# Patient Record
Sex: MALE | Race: WHITE | NOT HISPANIC OR LATINO | Employment: UNEMPLOYED | URBAN - METROPOLITAN AREA
[De-identification: names, ages, dates, MRNs, and addresses within clinical notes are randomized per-mention and may not be internally consistent; named-entity substitution may affect disease eponyms.]

---

## 2019-06-20 ENCOUNTER — HOSPITAL ENCOUNTER (EMERGENCY)
Facility: HOSPITAL | Age: 24
Discharge: HOME | End: 2019-06-21
Attending: EMERGENCY MEDICINE | Admitting: EMERGENCY MEDICINE

## 2019-06-20 DIAGNOSIS — R25.2 MUSCLE CRAMPS: Primary | ICD-10-CM

## 2019-06-20 DIAGNOSIS — E86.0 DEHYDRATION: ICD-10-CM

## 2019-06-20 DIAGNOSIS — R20.2 PARESTHESIAS: ICD-10-CM

## 2019-06-20 LAB
ALBUMIN SERPL-MCNC: 5.5 G/DL (ref 3.5–5.2)
ALBUMIN/GLOB SERPL: 2.4 G/DL
ALP SERPL-CCNC: 81 U/L (ref 39–117)
ALT SERPL W P-5'-P-CCNC: 19 U/L (ref 1–41)
ANION GAP SERPL CALCULATED.3IONS-SCNC: 21 MMOL/L
AST SERPL-CCNC: 24 U/L (ref 1–40)
BASOPHILS # BLD AUTO: 0.08 10*3/MM3 (ref 0–0.2)
BASOPHILS NFR BLD AUTO: 0.5 % (ref 0–1.5)
BILIRUB SERPL-MCNC: 1.3 MG/DL (ref 0.2–1.2)
BILIRUB UR QL STRIP: NEGATIVE
BUN BLD-MCNC: 16 MG/DL (ref 6–20)
BUN/CREAT SERPL: 14.5 (ref 7–25)
CALCIUM SPEC-SCNC: 10.4 MG/DL (ref 8.6–10.5)
CHLORIDE SERPL-SCNC: 93 MMOL/L (ref 98–107)
CK SERPL-CCNC: 306 U/L (ref 20–200)
CLARITY UR: CLEAR
CO2 SERPL-SCNC: 20 MMOL/L (ref 22–29)
COLOR UR: YELLOW
CREAT BLD-MCNC: 1.1 MG/DL (ref 0.76–1.27)
DEPRECATED RDW RBC AUTO: 36.7 FL (ref 37–54)
EOSINOPHIL # BLD AUTO: 0.06 10*3/MM3 (ref 0–0.4)
EOSINOPHIL NFR BLD AUTO: 0.4 % (ref 0.3–6.2)
ERYTHROCYTE [DISTWIDTH] IN BLOOD BY AUTOMATED COUNT: 11.7 % (ref 12.3–15.4)
GFR SERPL CREATININE-BSD FRML MDRD: 82 ML/MIN/1.73
GLOBULIN UR ELPH-MCNC: 2.3 GM/DL
GLUCOSE BLD-MCNC: 78 MG/DL (ref 65–99)
GLUCOSE UR STRIP-MCNC: NEGATIVE MG/DL
HCT VFR BLD AUTO: 47.9 % (ref 37.5–51)
HGB BLD-MCNC: 16.9 G/DL (ref 13–17.7)
HGB UR QL STRIP.AUTO: NEGATIVE
HOLD SPECIMEN: NORMAL
HOLD SPECIMEN: NORMAL
IMM GRANULOCYTES # BLD AUTO: 0.04 10*3/MM3 (ref 0–0.05)
IMM GRANULOCYTES NFR BLD AUTO: 0.2 % (ref 0–0.5)
KETONES UR QL STRIP: NEGATIVE
LEUKOCYTE ESTERASE UR QL STRIP.AUTO: NEGATIVE
LYMPHOCYTES # BLD AUTO: 3.39 10*3/MM3 (ref 0.7–3.1)
LYMPHOCYTES NFR BLD AUTO: 20.4 % (ref 19.6–45.3)
MCH RBC QN AUTO: 30.3 PG (ref 26.6–33)
MCHC RBC AUTO-ENTMCNC: 35.3 G/DL (ref 31.5–35.7)
MCV RBC AUTO: 86 FL (ref 79–97)
MONOCYTES # BLD AUTO: 1.15 10*3/MM3 (ref 0.1–0.9)
MONOCYTES NFR BLD AUTO: 6.9 % (ref 5–12)
NEUTROPHILS # BLD AUTO: 11.92 10*3/MM3 (ref 1.7–7)
NEUTROPHILS NFR BLD AUTO: 71.6 % (ref 42.7–76)
NITRITE UR QL STRIP: NEGATIVE
NRBC BLD AUTO-RTO: 0 /100 WBC (ref 0–0.2)
PH UR STRIP.AUTO: 6 [PH] (ref 5–8)
PLATELET # BLD AUTO: 283 10*3/MM3 (ref 140–450)
PMV BLD AUTO: 10.8 FL (ref 6–12)
POTASSIUM BLD-SCNC: 3.5 MMOL/L (ref 3.5–5.2)
PROT SERPL-MCNC: 7.8 G/DL (ref 6–8.5)
PROT UR QL STRIP: ABNORMAL
RBC # BLD AUTO: 5.57 10*6/MM3 (ref 4.14–5.8)
SODIUM BLD-SCNC: 134 MMOL/L (ref 136–145)
SP GR UR STRIP: 1.01 (ref 1–1.03)
UROBILINOGEN UR QL STRIP: ABNORMAL
WBC NRBC COR # BLD: 16.64 10*3/MM3 (ref 3.4–10.8)
WHOLE BLOOD HOLD SPECIMEN: NORMAL
WHOLE BLOOD HOLD SPECIMEN: NORMAL

## 2019-06-20 PROCEDURE — 81003 URINALYSIS AUTO W/O SCOPE: CPT

## 2019-06-20 PROCEDURE — 85025 COMPLETE CBC W/AUTO DIFF WBC: CPT

## 2019-06-20 PROCEDURE — 99284 EMERGENCY DEPT VISIT MOD MDM: CPT

## 2019-06-20 PROCEDURE — 82550 ASSAY OF CK (CPK): CPT | Performed by: EMERGENCY MEDICINE

## 2019-06-20 PROCEDURE — 80053 COMPREHEN METABOLIC PANEL: CPT

## 2019-06-20 RX ORDER — SODIUM CHLORIDE 0.9 % (FLUSH) 0.9 %
10 SYRINGE (ML) INJECTION AS NEEDED
Status: DISCONTINUED | OUTPATIENT
Start: 2019-06-20 | End: 2019-06-21 | Stop reason: HOSPADM

## 2019-06-21 VITALS
DIASTOLIC BLOOD PRESSURE: 68 MMHG | BODY MASS INDEX: 19.6 KG/M2 | WEIGHT: 140 LBS | OXYGEN SATURATION: 98 % | SYSTOLIC BLOOD PRESSURE: 116 MMHG | HEART RATE: 54 BPM | TEMPERATURE: 98 F | RESPIRATION RATE: 18 BRPM | HEIGHT: 71 IN

## 2019-06-21 PROCEDURE — 96360 HYDRATION IV INFUSION INIT: CPT

## 2019-06-21 RX ORDER — CETIRIZINE HYDROCHLORIDE 10 MG/1
10 TABLET ORAL DAILY
COMMUNITY
End: 2020-06-26

## 2019-06-21 RX ADMIN — SODIUM CHLORIDE, POTASSIUM CHLORIDE, SODIUM LACTATE AND CALCIUM CHLORIDE 2000 ML: 600; 310; 30; 20 INJECTION, SOLUTION INTRAVENOUS at 00:05

## 2020-06-26 ENCOUNTER — HOSPITAL ENCOUNTER (OUTPATIENT)
Facility: HOSPITAL | Age: 25
Setting detail: OBSERVATION
Discharge: HOME | End: 2020-06-27
Attending: EMERGENCY MEDICINE | Admitting: INTERNAL MEDICINE

## 2020-06-26 DIAGNOSIS — N17.9 AKI (ACUTE KIDNEY INJURY) (HCC): ICD-10-CM

## 2020-06-26 DIAGNOSIS — M62.82 NON-TRAUMATIC RHABDOMYOLYSIS: Primary | ICD-10-CM

## 2020-06-26 PROBLEM — E86.0 DEHYDRATION: Status: ACTIVE | Noted: 2020-06-26

## 2020-06-26 PROBLEM — E87.6 HYPOKALEMIA: Status: ACTIVE | Noted: 2020-06-26

## 2020-06-26 PROBLEM — R74.8 ELEVATED LIVER ENZYMES: Status: ACTIVE | Noted: 2020-06-26

## 2020-06-26 PROBLEM — D72.829 LEUKOCYTOSIS: Status: ACTIVE | Noted: 2020-06-26

## 2020-06-26 PROBLEM — Z72.0 TOBACCO ABUSE: Status: ACTIVE | Noted: 2020-06-26

## 2020-06-26 LAB
ALBUMIN SERPL-MCNC: 5.4 G/DL (ref 3.5–5.2)
ALBUMIN/GLOB SERPL: 2.1 G/DL
ALP SERPL-CCNC: 58 U/L (ref 39–117)
ALT SERPL W P-5'-P-CCNC: 57 U/L (ref 1–41)
AMPHET+METHAMPHET UR QL: NEGATIVE
AMPHETAMINES UR QL: NEGATIVE
ANION GAP SERPL CALCULATED.3IONS-SCNC: 17 MMOL/L (ref 5–15)
AST SERPL-CCNC: 92 U/L (ref 1–40)
BACTERIA UR QL AUTO: ABNORMAL /HPF
BARBITURATES UR QL SCN: NEGATIVE
BASOPHILS # BLD AUTO: 0.05 10*3/MM3 (ref 0–0.2)
BASOPHILS NFR BLD AUTO: 0.3 % (ref 0–1.5)
BENZODIAZ UR QL SCN: NEGATIVE
BILIRUB SERPL-MCNC: 1.4 MG/DL (ref 0.2–1.2)
BILIRUB UR QL STRIP: NEGATIVE
BUN BLD-MCNC: 28 MG/DL (ref 6–20)
BUN/CREAT SERPL: 17.4 (ref 7–25)
BUPRENORPHINE SERPL-MCNC: NEGATIVE NG/ML
CALCIUM SPEC-SCNC: 9.9 MG/DL (ref 8.6–10.5)
CANNABINOIDS SERPL QL: POSITIVE
CHLORIDE SERPL-SCNC: 93 MMOL/L (ref 98–107)
CK SERPL-CCNC: 2599 U/L (ref 20–200)
CLARITY UR: CLEAR
CO2 SERPL-SCNC: 24 MMOL/L (ref 22–29)
COCAINE UR QL: NEGATIVE
COLOR UR: YELLOW
CREAT BLD-MCNC: 1.61 MG/DL (ref 0.76–1.27)
DEPRECATED RDW RBC AUTO: 38.2 FL (ref 37–54)
EOSINOPHIL # BLD AUTO: 0.01 10*3/MM3 (ref 0–0.4)
EOSINOPHIL NFR BLD AUTO: 0.1 % (ref 0.3–6.2)
ERYTHROCYTE [DISTWIDTH] IN BLOOD BY AUTOMATED COUNT: 11.9 % (ref 12.3–15.4)
GFR SERPL CREATININE-BSD FRML MDRD: 53 ML/MIN/1.73
GLOBULIN UR ELPH-MCNC: 2.6 GM/DL
GLUCOSE BLD-MCNC: 86 MG/DL (ref 65–99)
GLUCOSE UR STRIP-MCNC: NEGATIVE MG/DL
HCT VFR BLD AUTO: 47.4 % (ref 37.5–51)
HGB BLD-MCNC: 16.7 G/DL (ref 13–17.7)
HGB UR QL STRIP.AUTO: NEGATIVE
HYALINE CASTS UR QL AUTO: ABNORMAL /LPF
IMM GRANULOCYTES # BLD AUTO: 0.08 10*3/MM3 (ref 0–0.05)
IMM GRANULOCYTES NFR BLD AUTO: 0.5 % (ref 0–0.5)
KETONES UR QL STRIP: ABNORMAL
LEUKOCYTE ESTERASE UR QL STRIP.AUTO: NEGATIVE
LIPASE SERPL-CCNC: 35 U/L (ref 13–60)
LYMPHOCYTES # BLD AUTO: 1.62 10*3/MM3 (ref 0.7–3.1)
LYMPHOCYTES NFR BLD AUTO: 10 % (ref 19.6–45.3)
MAGNESIUM SERPL-MCNC: 2.2 MG/DL (ref 1.6–2.6)
MCH RBC QN AUTO: 31.1 PG (ref 26.6–33)
MCHC RBC AUTO-ENTMCNC: 35.2 G/DL (ref 31.5–35.7)
MCV RBC AUTO: 88.3 FL (ref 79–97)
METHADONE UR QL SCN: NEGATIVE
MONOCYTES # BLD AUTO: 1.24 10*3/MM3 (ref 0.1–0.9)
MONOCYTES NFR BLD AUTO: 7.6 % (ref 5–12)
NEUTROPHILS # BLD AUTO: 13.27 10*3/MM3 (ref 1.7–7)
NEUTROPHILS NFR BLD AUTO: 81.5 % (ref 42.7–76)
NITRITE UR QL STRIP: NEGATIVE
NRBC BLD AUTO-RTO: 0 /100 WBC (ref 0–0.2)
OPIATES UR QL: NEGATIVE
OXYCODONE UR QL SCN: NEGATIVE
PCP UR QL SCN: NEGATIVE
PH UR STRIP.AUTO: 6 [PH] (ref 5–8)
PLATELET # BLD AUTO: 281 10*3/MM3 (ref 140–450)
PMV BLD AUTO: 10.8 FL (ref 6–12)
POTASSIUM BLD-SCNC: 3.4 MMOL/L (ref 3.5–5.2)
PROPOXYPH UR QL: NEGATIVE
PROT SERPL-MCNC: 8 G/DL (ref 6–8.5)
PROT UR QL STRIP: ABNORMAL
RBC # BLD AUTO: 5.37 10*6/MM3 (ref 4.14–5.8)
RBC # UR: ABNORMAL /HPF
REF LAB TEST METHOD: ABNORMAL
SODIUM BLD-SCNC: 134 MMOL/L (ref 136–145)
SP GR UR STRIP: 1.02 (ref 1–1.03)
SQUAMOUS #/AREA URNS HPF: ABNORMAL /HPF
TRICYCLICS UR QL SCN: NEGATIVE
UROBILINOGEN UR QL STRIP: ABNORMAL
WBC NRBC COR # BLD: 16.27 10*3/MM3 (ref 3.4–10.8)
WBC UR QL AUTO: ABNORMAL /HPF

## 2020-06-26 PROCEDURE — 82550 ASSAY OF CK (CPK): CPT | Performed by: NURSE PRACTITIONER

## 2020-06-26 PROCEDURE — 83735 ASSAY OF MAGNESIUM: CPT | Performed by: NURSE PRACTITIONER

## 2020-06-26 PROCEDURE — G0378 HOSPITAL OBSERVATION PER HR: HCPCS

## 2020-06-26 PROCEDURE — 99284 EMERGENCY DEPT VISIT MOD MDM: CPT

## 2020-06-26 PROCEDURE — 85025 COMPLETE CBC W/AUTO DIFF WBC: CPT | Performed by: NURSE PRACTITIONER

## 2020-06-26 PROCEDURE — 80053 COMPREHEN METABOLIC PANEL: CPT | Performed by: NURSE PRACTITIONER

## 2020-06-26 PROCEDURE — 99219 PR INITIAL OBSERVATION CARE/DAY 50 MINUTES: CPT | Performed by: NURSE PRACTITIONER

## 2020-06-26 PROCEDURE — 80306 DRUG TEST PRSMV INSTRMNT: CPT | Performed by: NURSE PRACTITIONER

## 2020-06-26 PROCEDURE — 81001 URINALYSIS AUTO W/SCOPE: CPT | Performed by: NURSE PRACTITIONER

## 2020-06-26 PROCEDURE — 83690 ASSAY OF LIPASE: CPT | Performed by: NURSE PRACTITIONER

## 2020-06-26 RX ORDER — LEVOCETIRIZINE DIHYDROCHLORIDE 5 MG/1
5 TABLET, FILM COATED ORAL EVERY EVENING
COMMUNITY
End: 2022-03-07

## 2020-06-26 RX ORDER — SODIUM CHLORIDE 0.9 % (FLUSH) 0.9 %
10 SYRINGE (ML) INJECTION AS NEEDED
Status: DISCONTINUED | OUTPATIENT
Start: 2020-06-26 | End: 2020-06-27 | Stop reason: HOSPADM

## 2020-06-26 RX ADMIN — SODIUM CHLORIDE, POTASSIUM CHLORIDE, SODIUM LACTATE AND CALCIUM CHLORIDE 1000 ML: 600; 310; 30; 20 INJECTION, SOLUTION INTRAVENOUS at 17:16

## 2020-06-26 RX ADMIN — SODIUM CHLORIDE, POTASSIUM CHLORIDE, SODIUM LACTATE AND CALCIUM CHLORIDE 1000 ML: 600; 310; 30; 20 INJECTION, SOLUTION INTRAVENOUS at 16:01

## 2020-06-27 VITALS
HEIGHT: 71 IN | SYSTOLIC BLOOD PRESSURE: 110 MMHG | RESPIRATION RATE: 18 BRPM | DIASTOLIC BLOOD PRESSURE: 50 MMHG | BODY MASS INDEX: 20.58 KG/M2 | TEMPERATURE: 98.7 F | WEIGHT: 147 LBS | HEART RATE: 50 BPM | OXYGEN SATURATION: 97 %

## 2020-06-27 LAB
ALBUMIN SERPL-MCNC: 3.9 G/DL (ref 3.5–5.2)
ALBUMIN/GLOB SERPL: 2.3 G/DL
ALP SERPL-CCNC: 46 U/L (ref 39–117)
ALT SERPL W P-5'-P-CCNC: 41 U/L (ref 1–41)
ANION GAP SERPL CALCULATED.3IONS-SCNC: 8 MMOL/L (ref 5–15)
AST SERPL-CCNC: 51 U/L (ref 1–40)
BASOPHILS # BLD AUTO: 0.05 10*3/MM3 (ref 0–0.2)
BASOPHILS NFR BLD AUTO: 0.6 % (ref 0–1.5)
BILIRUB SERPL-MCNC: 0.7 MG/DL (ref 0.2–1.2)
BUN BLD-MCNC: 27 MG/DL (ref 6–20)
BUN/CREAT SERPL: 25.5 (ref 7–25)
CALCIUM SPEC-SCNC: 8.5 MG/DL (ref 8.6–10.5)
CHLORIDE SERPL-SCNC: 105 MMOL/L (ref 98–107)
CK SERPL-CCNC: 1269 U/L (ref 20–200)
CO2 SERPL-SCNC: 27 MMOL/L (ref 22–29)
CREAT BLD-MCNC: 1.06 MG/DL (ref 0.76–1.27)
DEPRECATED RDW RBC AUTO: 40.9 FL (ref 37–54)
EOSINOPHIL # BLD AUTO: 0.14 10*3/MM3 (ref 0–0.4)
EOSINOPHIL NFR BLD AUTO: 1.7 % (ref 0.3–6.2)
ERYTHROCYTE [DISTWIDTH] IN BLOOD BY AUTOMATED COUNT: 12.2 % (ref 12.3–15.4)
GFR SERPL CREATININE-BSD FRML MDRD: 85 ML/MIN/1.73
GLOBULIN UR ELPH-MCNC: 1.7 GM/DL
GLUCOSE BLD-MCNC: 100 MG/DL (ref 65–99)
HCT VFR BLD AUTO: 39.8 % (ref 37.5–51)
HGB BLD-MCNC: 13.6 G/DL (ref 13–17.7)
IMM GRANULOCYTES # BLD AUTO: 0.02 10*3/MM3 (ref 0–0.05)
IMM GRANULOCYTES NFR BLD AUTO: 0.2 % (ref 0–0.5)
LYMPHOCYTES # BLD AUTO: 3.27 10*3/MM3 (ref 0.7–3.1)
LYMPHOCYTES NFR BLD AUTO: 40.8 % (ref 19.6–45.3)
MCH RBC QN AUTO: 31.2 PG (ref 26.6–33)
MCHC RBC AUTO-ENTMCNC: 34.2 G/DL (ref 31.5–35.7)
MCV RBC AUTO: 91.3 FL (ref 79–97)
MONOCYTES # BLD AUTO: 0.9 10*3/MM3 (ref 0.1–0.9)
MONOCYTES NFR BLD AUTO: 11.2 % (ref 5–12)
NEUTROPHILS # BLD AUTO: 3.64 10*3/MM3 (ref 1.7–7)
NEUTROPHILS NFR BLD AUTO: 45.5 % (ref 42.7–76)
NRBC BLD AUTO-RTO: 0 /100 WBC (ref 0–0.2)
PLATELET # BLD AUTO: 220 10*3/MM3 (ref 140–450)
PMV BLD AUTO: 11.4 FL (ref 6–12)
POTASSIUM BLD-SCNC: 4.5 MMOL/L (ref 3.5–5.2)
PROT SERPL-MCNC: 5.6 G/DL (ref 6–8.5)
RBC # BLD AUTO: 4.36 10*6/MM3 (ref 4.14–5.8)
SODIUM BLD-SCNC: 140 MMOL/L (ref 136–145)
WBC NRBC COR # BLD: 8.02 10*3/MM3 (ref 3.4–10.8)

## 2020-06-27 PROCEDURE — G0378 HOSPITAL OBSERVATION PER HR: HCPCS

## 2020-06-27 PROCEDURE — 85025 COMPLETE CBC W/AUTO DIFF WBC: CPT | Performed by: NURSE PRACTITIONER

## 2020-06-27 PROCEDURE — 99217 PR OBSERVATION CARE DISCHARGE MANAGEMENT: CPT | Performed by: INTERNAL MEDICINE

## 2020-06-27 PROCEDURE — 80053 COMPREHEN METABOLIC PANEL: CPT | Performed by: NURSE PRACTITIONER

## 2020-06-27 PROCEDURE — 82550 ASSAY OF CK (CPK): CPT | Performed by: NURSE PRACTITIONER

## 2020-06-27 RX ORDER — SODIUM CHLORIDE 0.9 % (FLUSH) 0.9 %
10 SYRINGE (ML) INJECTION AS NEEDED
Status: DISCONTINUED | OUTPATIENT
Start: 2020-06-27 | End: 2020-06-27 | Stop reason: HOSPADM

## 2020-06-27 RX ORDER — POTASSIUM CHLORIDE 750 MG/1
20 CAPSULE, EXTENDED RELEASE ORAL ONCE
Status: COMPLETED | OUTPATIENT
Start: 2020-06-27 | End: 2020-06-27

## 2020-06-27 RX ORDER — SODIUM CHLORIDE 0.9 % (FLUSH) 0.9 %
10 SYRINGE (ML) INJECTION EVERY 12 HOURS SCHEDULED
Status: DISCONTINUED | OUTPATIENT
Start: 2020-06-27 | End: 2020-06-27 | Stop reason: HOSPADM

## 2020-06-27 RX ORDER — ACETAMINOPHEN 325 MG/1
650 TABLET ORAL EVERY 4 HOURS PRN
Status: DISCONTINUED | OUTPATIENT
Start: 2020-06-27 | End: 2020-06-27 | Stop reason: HOSPADM

## 2020-06-27 RX ORDER — SODIUM CHLORIDE, SODIUM LACTATE, POTASSIUM CHLORIDE, CALCIUM CHLORIDE 600; 310; 30; 20 MG/100ML; MG/100ML; MG/100ML; MG/100ML
200 INJECTION, SOLUTION INTRAVENOUS CONTINUOUS
Status: DISCONTINUED | OUTPATIENT
Start: 2020-06-27 | End: 2020-06-27 | Stop reason: HOSPADM

## 2020-06-27 RX ADMIN — POTASSIUM CHLORIDE 20 MEQ: 10 CAPSULE, COATED, EXTENDED RELEASE ORAL at 02:18

## 2020-06-27 RX ADMIN — SODIUM CHLORIDE, POTASSIUM CHLORIDE, SODIUM LACTATE AND CALCIUM CHLORIDE 200 ML/HR: 600; 310; 30; 20 INJECTION, SOLUTION INTRAVENOUS at 02:19

## 2022-03-07 ENCOUNTER — OFFICE VISIT (OUTPATIENT)
Dept: FAMILY MEDICINE CLINIC | Facility: CLINIC | Age: 27
End: 2022-03-07

## 2022-03-07 ENCOUNTER — PATIENT ROUNDING (BHMG ONLY) (OUTPATIENT)
Dept: FAMILY MEDICINE CLINIC | Facility: CLINIC | Age: 27
End: 2022-03-07

## 2022-03-07 ENCOUNTER — LAB (OUTPATIENT)
Dept: LAB | Facility: HOSPITAL | Age: 27
End: 2022-03-07

## 2022-03-07 VITALS
RESPIRATION RATE: 14 BRPM | WEIGHT: 146.4 LBS | OXYGEN SATURATION: 96 % | DIASTOLIC BLOOD PRESSURE: 82 MMHG | HEART RATE: 82 BPM | SYSTOLIC BLOOD PRESSURE: 130 MMHG | TEMPERATURE: 97.5 F | HEIGHT: 71 IN | BODY MASS INDEX: 20.5 KG/M2

## 2022-03-07 DIAGNOSIS — Z00.00 PREVENTATIVE HEALTH CARE: ICD-10-CM

## 2022-03-07 DIAGNOSIS — J32.9 SINUSITIS, UNSPECIFIED CHRONICITY, UNSPECIFIED LOCATION: ICD-10-CM

## 2022-03-07 DIAGNOSIS — J06.9 UPPER RESPIRATORY TRACT INFECTION, UNSPECIFIED TYPE: Primary | ICD-10-CM

## 2022-03-07 DIAGNOSIS — Z87.39 HISTORY OF RHABDOMYOLYSIS: ICD-10-CM

## 2022-03-07 LAB
25(OH)D3 SERPL-MCNC: 21.6 NG/ML
BACTERIA UR QL AUTO: ABNORMAL /HPF
BILIRUB UR QL STRIP: NEGATIVE
CLARITY UR: CLEAR
COLOR UR: ABNORMAL
DEPRECATED RDW RBC AUTO: 37.5 FL (ref 37–54)
ERYTHROCYTE [DISTWIDTH] IN BLOOD BY AUTOMATED COUNT: 11.8 % (ref 12.3–15.4)
ERYTHROCYTE [SEDIMENTATION RATE] IN BLOOD: 21 MM/HR (ref 0–15)
GLUCOSE UR STRIP-MCNC: NEGATIVE MG/DL
HCT VFR BLD AUTO: 50 % (ref 37.5–51)
HGB BLD-MCNC: 17.4 G/DL (ref 13–17.7)
HGB UR QL STRIP.AUTO: NEGATIVE
HYALINE CASTS UR QL AUTO: ABNORMAL /LPF
KETONES UR QL STRIP: NEGATIVE
LEUKOCYTE ESTERASE UR QL STRIP.AUTO: NEGATIVE
MCH RBC QN AUTO: 30.6 PG (ref 26.6–33)
MCHC RBC AUTO-ENTMCNC: 34.8 G/DL (ref 31.5–35.7)
MCV RBC AUTO: 88 FL (ref 79–97)
NITRITE UR QL STRIP: NEGATIVE
PH UR STRIP.AUTO: 6 [PH] (ref 5–8)
PLATELET # BLD AUTO: 191 10*3/MM3 (ref 140–450)
PMV BLD AUTO: 12 FL (ref 6–12)
PROT UR QL STRIP: ABNORMAL
RBC # BLD AUTO: 5.68 10*6/MM3 (ref 4.14–5.8)
RBC # UR STRIP: ABNORMAL /HPF
REF LAB TEST METHOD: ABNORMAL
SP GR UR STRIP: >=1.03 (ref 1–1.03)
SQUAMOUS #/AREA URNS HPF: ABNORMAL /HPF
UROBILINOGEN UR QL STRIP: ABNORMAL
WBC # UR STRIP: ABNORMAL /HPF
WBC NRBC COR # BLD: 4.83 10*3/MM3 (ref 3.4–10.8)

## 2022-03-07 PROCEDURE — 82550 ASSAY OF CK (CPK): CPT

## 2022-03-07 PROCEDURE — 81001 URINALYSIS AUTO W/SCOPE: CPT

## 2022-03-07 PROCEDURE — 36415 COLL VENOUS BLD VENIPUNCTURE: CPT

## 2022-03-07 PROCEDURE — 84443 ASSAY THYROID STIM HORMONE: CPT

## 2022-03-07 PROCEDURE — 82306 VITAMIN D 25 HYDROXY: CPT

## 2022-03-07 PROCEDURE — 85027 COMPLETE CBC AUTOMATED: CPT

## 2022-03-07 PROCEDURE — 99214 OFFICE O/P EST MOD 30 MIN: CPT | Performed by: PHYSICIAN ASSISTANT

## 2022-03-07 PROCEDURE — 86140 C-REACTIVE PROTEIN: CPT

## 2022-03-07 PROCEDURE — 80053 COMPREHEN METABOLIC PANEL: CPT

## 2022-03-07 PROCEDURE — 85652 RBC SED RATE AUTOMATED: CPT

## 2022-03-07 PROCEDURE — 86038 ANTINUCLEAR ANTIBODIES: CPT

## 2022-03-07 PROCEDURE — 80061 LIPID PANEL: CPT

## 2022-03-07 PROCEDURE — U0004 COV-19 TEST NON-CDC HGH THRU: HCPCS | Performed by: PHYSICIAN ASSISTANT

## 2022-03-07 NOTE — PROGRESS NOTES
Chief Complaint   Patient presents with   • Headache     X 3 days   • head congestion   • Nasal Congestion       HPI     Cj Lerma III is a pleasant 26 y.o. male with a PMH of seasonal allergies who presents for initial visit.   He c/o 5-day history of frontal head pressure and runny nose. Associated headaches, body aches, cough productive of green-yellow sputum. Denies fever, chills, sick exposures. Currently unemployed. He states he is in the process of moving. He works part-time for [employer]. He tends to get sinus infections every year. He has used sudafed and mucinex, ibuprofen for headache.     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 First episode occurred at age 20 during a soccer tournament, every muscle in his body was flexed for multiple hours. He had played 2-3 games. Since then this has recurred, requiring trips to the ER. Episodes can also can occur while driving: hands and lips go numb then muscles start tensing up. Now he knows to pull over and immediately lie down. In past referred to neurology at  but he was not seen. At one time it was recommended he see a specialist in [place] or [place] but he could not do this because of his insurance. Since then his insurance has changed.      Past Medical History:   Diagnosis Date   • Exercise-induced asthma    • Rhabdomyolysis        Past Surgical History:   Procedure Laterality Date   • KNEE ACL RECONSTRUCTION Left    • TONSILLECTOMY     • WISDOM TOOTH EXTRACTION         Family History   Problem Relation Age of Onset   • Thyroid disease Mother    • Hypertension Father    • Breast cancer Paternal Aunt    • Lung cancer Maternal Grandfather    • Lung cancer Paternal Grandfather    • Hypertension Paternal Grandfather        Social History     Socioeconomic History   • Marital status: Single   Tobacco Use   • Smoking status: Former Smoker     Types: Cigarettes, Electronic Cigarette   •  Smokeless tobacco: Never Used   • Tobacco comment: socially   Vaping Use   • Vaping Use: Every day   • Substances: Nicotine, THC, CBD, Flavoring   • Devices: Disposable   Substance and Sexual Activity   • Alcohol use: Yes     Comment: socially   • Drug use: Not Currently     Types: Marijuana     Comment: occasionally   • Sexual activity: Defer       No Known Allergies    ROS    Review of Systems   Constitutional: Negative for chills and fever.   HENT: Positive for congestion, rhinorrhea and sinus pressure.    Respiratory: Positive for cough. Negative for shortness of breath and wheezing.    Musculoskeletal: Positive for myalgias.   Neurological: Positive for headache.       Vitals:    03/07/22 1449   BP: 130/82   Pulse: 82   Resp: 14   Temp: 97.5 °F (36.4 °C)   SpO2: 96%     Body mass index is 20.42 kg/m².      Current Outpatient Medications:   •  albuterol (PROVENTIL HFA;VENTOLIN HFA) 108 (90 BASE) MCG/ACT inhaler, Inhale 2 puffs every 4 (four) hours as needed for wheezing., Disp: , Rfl:   •  Cetirizine HCl (ZYRTEC ALLERGY PO), Take  by mouth., Disp: , Rfl:     PE    Physical Exam  Vitals reviewed.   Constitutional:       General: He is not in acute distress.     Appearance: He is well-developed.   HENT:      Head: Normocephalic.      Right Ear: Tympanic membrane and ear canal normal. Tympanic membrane is not erythematous.      Left Ear: Tympanic membrane and ear canal normal. Tympanic membrane is not erythematous.      Nose:      Right Sinus: No maxillary sinus tenderness or frontal sinus tenderness.      Left Sinus: No maxillary sinus tenderness or frontal sinus tenderness.      Mouth/Throat:      Mouth: Mucous membranes are moist.      Pharynx: Uvula midline. Posterior oropharyngeal erythema (mild) present.      Tonsils: No tonsillar exudate.   Eyes:      General:         Right eye: No discharge.         Left eye: No discharge.      Conjunctiva/sclera: Conjunctivae normal.   Cardiovascular:      Rate and Rhythm:  Normal rate and regular rhythm.      Heart sounds: Normal heart sounds.   Pulmonary:      Effort: Pulmonary effort is normal. No respiratory distress.      Breath sounds: Normal breath sounds. No wheezing, rhonchi or rales.   Chest:   Breasts:      Right: No supraclavicular adenopathy.      Left: No supraclavicular adenopathy.       Musculoskeletal:      Cervical back: Normal range of motion and neck supple.   Lymphadenopathy:      Cervical: No cervical adenopathy.      Upper Body:      Right upper body: No supraclavicular adenopathy.      Left upper body: No supraclavicular adenopathy.   Skin:     General: Skin is warm and dry.   Psychiatric:         Behavior: Behavior normal.          A/P    Problem List Items Addressed This Visit    None     Visit Diagnoses     Upper respiratory tract infection, unspecified type    -  Primary  -Discussed likely viral etiology and initial symptomatic and supportive management  -Continue Sudafed, Mucinex, and nasal saline rinses  -We will order Covid test to rule this out.  Counseled patient to quarantine at home until his test result returns    Relevant Orders    COVID-19 PCR, [facility], NP SWAB IN [facility] TRANSPORT MEDIA/ORAL SWISH 24-30 HR TAT - Swab, Nasopharynx    Sinusitis, unspecified chronicity, unspecified location        History of rhabdomyolysis      -Nontraumatic  -Check labs as shown  -Discussed possible metabolic disorder    Relevant Orders    [facility]    GAMAL With / DsDNA, RNP, Sjogrens A / B, [place]    C-reactive Protein    Sedimentation Rate    Preventative health care        Relevant Orders    CBC (No Diff)    Comprehensive Metabolic Panel    Lipid Panel    TSH Rfx On Abnormal To Free T4    Urinalysis With Culture If Indicated - Urine, Clean Catch    Vitamin D 25 Hydroxy          Plan of care was reviewed with patient at the conclusion of today's visit. Education was provided regarding diagnoses, management, prescribed or recommended OTC products, and the importance  of compliance with follow-up appointments. The patient was counseled regarding the risks, benefits, and possible side-effects of treatment. I advised the patient to keep me informed of any acute changes in their status including new, worsening, or persistent symptoms. Patient expresses understanding and agreement with the management plan.        DIANE Correa

## 2022-03-07 NOTE — EXTERNAL PATIENT INSTRUCTIONS
Patient Education   Table of Contents       Sinusitis, Adult     To view videos and all your education online visit,   [email or web address]   or scan this QR code with your smartphone.                  Sinusitis, Adult         Sinusitis is inflammation of your sinuses. Sinuses are hollow spaces in the bones around your face. Your sinuses are located:       Around your eyes.       In the middle of your forehead.       Behind your nose.       In your cheekbones.     Mucus normally drains out of your sinuses. When your nasal tissues become inflamed or swollen, mucus can become trapped or blocked. This allows bacteria, viruses, and fungi to grow, which leads to infection. Most infections of the sinuses are caused by a virus.   Sinusitis can develop quickly. It can last for up to 4 weeks (acute) or for more than 12 weeks (chronic). Sinusitis often develops after a cold.     What are the causes?    This condition is caused by anything that creates swelling in the sinuses or stops mucus from draining. This includes:       Allergies.       Asthma.       Infection from bacteria or viruses.       Deformities or blockages in your nose or sinuses.       Abnormal growths in the nose (nasal polyps).       Pollutants, such as chemicals or irritants in the air.       Infection from fungi (rare).     What increases the risk?    You are more likely to develop this condition if you:       Have a weak body defense system (immune system).       Do a lot of swimming or diving.       Overuse nasal sprays.       Smoke.     What are the signs or symptoms?    The main symptoms of this condition are pain and a feeling of pressure around the affected sinuses. Other symptoms include:       Stuffy nose or congestion.       Thick drainage from your nose.       Swelling and warmth over the affected sinuses.       Headache.       Upper toothache.       A cough that may get worse at night.       Extra mucus that collects in the throat or  the back of the nose (postnasal drip).       Decreased sense of smell and taste.       Fatigue.       A fever.       Sore throat.       Bad breath.     How is this diagnosed?    This condition is diagnosed based on:       Your symptoms.       Your medical history.       A physical exam.      Tests to find out if your condition is acute or chronic. This may include:       Checking your nose for nasal polyps.       Viewing your sinuses using a device that has a light (endoscope).       Testing for allergies or bacteria.       Imaging tests, such as an MRI or CT scan.     In rare cases, a bone biopsy may be done to rule out more serious types of fungal sinus disease.   How is this treated?    Treatment for sinusitis depends on the cause and whether your condition is chronic or acute.      If caused by a virus, your symptoms should go away on their own within 10 days. You may be given medicines to relieve symptoms. They include:       Medicines that shrink swollen nasal passages (topical intranasal decongestants).       Medicines that treat allergies (antihistamines).       A spray that eases inflammation of the nostrils (topical intranasal corticosteroids).       Rinses that help get rid of thick mucus in your nose (nasal saline washes).      If caused by bacteria, your health care provider may recommend waiting to see if your symptoms improve. Most bacterial infections will get better without antibiotic medicine. You may be given antibiotics if you have:       A severe infection.       A weak immune system.       If caused by narrow nasal passages or nasal polyps, you may need to have surgery.     Follow these instructions at home:   Medicines         Take, use, or apply over-the-counter and prescription medicines only as told by your health care provider. These may include nasal sprays.       If you were prescribed an antibiotic medicine, take it as told by your health care provider. Do not  stop taking the  antibiotic even if you start to feel better.     Hydrate and humidify            Drink enough fluid to keep your urine pale yellow. Staying hydrated will help to thin your mucus.       Use a cool mist humidifier to keep the humidity level in your home above 50%.       Inhale steam for 10?15 minutes, 3?4 times a day, or as told by your health care provider. You can do this in the bathroom while a hot shower is running.       Limit your exposure to cool or dry air.     Rest         Rest as much as possible.       Sleep with your head raised (elevated).       Make sure you get enough sleep each night.     General instructions            Apply a warm, moist washcloth to your face 3?4 times a day or as told by your health care provider. This will help with discomfort.       Wash your hands often with soap and water to reduce your exposure to germs. If soap and water are not available, use hand .      Do not  smoke. Avoid being around people who are smoking (secondhand smoke).       Keep all follow-up visits as told by your health care provider. This is important.         Contact a health care provider if:         You have a fever.       Your symptoms get worse.       Your symptoms do not improve within 10 days.     Get help right away if:         You have a severe headache.       You have persistent vomiting.       You have severe pain or swelling around your face or eyes.       You have vision problems.       You develop confusion.       Your neck is stiff.       You have trouble breathing.     Summary         Sinusitis is soreness and inflammation of your sinuses. Sinuses are hollow spaces in the bones around your face.       This condition is caused by nasal tissues that become inflamed or swollen. The swelling traps or blocks the flow of mucus. This allows bacteria, viruses, and fungi to grow, which leads to infection.       If you were prescribed an antibiotic medicine, take it as told by your health care  provider. Do not  stop taking the antibiotic even if you start to feel better.       Keep all follow-up visits as told by your health care provider. This is important.     This information is not intended to replace advice given to you by your health care provider. Make sure you discuss any questions you have with your health care provider.     Document Released: 12/18/2006Document Revised: 05/20/2019Document Reviewed: 05/20/2019     Elsevier Patient Education ? 2021 [employer]

## 2022-03-07 NOTE — EXTERNAL PATIENT INSTRUCTIONS
Patient Education   Table of Contents       Sinusitis, Adult     To view videos and all your education online visit,   [email or web address]   or scan this QR code with your smartphone.                  Sinusitis, Adult         Sinusitis is inflammation of your sinuses. Sinuses are hollow spaces in the bones around your face. Your sinuses are located:       Around your eyes.       In the middle of your forehead.       Behind your nose.       In your cheekbones.     Mucus normally drains out of your sinuses. When your nasal tissues become inflamed or swollen, mucus can become trapped or blocked. This allows bacteria, viruses, and fungi to grow, which leads to infection. Most infections of the sinuses are caused by a virus.   Sinusitis can develop quickly. It can last for up to 4 weeks (acute) or for more than 12 weeks (chronic). Sinusitis often develops after a cold.     What are the causes?    This condition is caused by anything that creates swelling in the sinuses or stops mucus from draining. This includes:       Allergies.       Asthma.       Infection from bacteria or viruses.       Deformities or blockages in your nose or sinuses.       Abnormal growths in the nose (nasal polyps).       Pollutants, such as chemicals or irritants in the air.       Infection from fungi (rare).     What increases the risk?    You are more likely to develop this condition if you:       Have a weak body defense system (immune system).       Do a lot of swimming or diving.       Overuse nasal sprays.       Smoke.     What are the signs or symptoms?    The main symptoms of this condition are pain and a feeling of pressure around the affected sinuses. Other symptoms include:       Stuffy nose or congestion.       Thick drainage from your nose.       Swelling and warmth over the affected sinuses.       Headache.       Upper toothache.       A cough that may get worse at night.       Extra mucus that collects in the throat or  the back of the nose (postnasal drip).       Decreased sense of smell and taste.       Fatigue.       A fever.       Sore throat.       Bad breath.     How is this diagnosed?    This condition is diagnosed based on:       Your symptoms.       Your medical history.       A physical exam.      Tests to find out if your condition is acute or chronic. This may include:       Checking your nose for nasal polyps.       Viewing your sinuses using a device that has a light (endoscope).       Testing for allergies or bacteria.       Imaging tests, such as an MRI or CT scan.     In rare cases, a bone biopsy may be done to rule out more serious types of fungal sinus disease.   How is this treated?    Treatment for sinusitis depends on the cause and whether your condition is chronic or acute.      If caused by a virus, your symptoms should go away on their own within 10 days. You may be given medicines to relieve symptoms. They include:       Medicines that shrink swollen nasal passages (topical intranasal decongestants).       Medicines that treat allergies (antihistamines).       A spray that eases inflammation of the nostrils (topical intranasal corticosteroids).       Rinses that help get rid of thick mucus in your nose (nasal saline washes).      If caused by bacteria, your health care provider may recommend waiting to see if your symptoms improve. Most bacterial infections will get better without antibiotic medicine. You may be given antibiotics if you have:       A severe infection.       A weak immune system.       If caused by narrow nasal passages or nasal polyps, you may need to have surgery.     Follow these instructions at home:   Medicines         Take, use, or apply over-the-counter and prescription medicines only as told by your health care provider. These may include nasal sprays.       If you were prescribed an antibiotic medicine, take it as told by your health care provider. Do not  stop taking the  antibiotic even if you start to feel better.     Hydrate and humidify            Drink enough fluid to keep your urine pale yellow. Staying hydrated will help to thin your mucus.       Use a cool mist humidifier to keep the humidity level in your home above 50%.       Inhale steam for 10?15 minutes, 3?4 times a day, or as told by your health care provider. You can do this in the bathroom while a hot shower is running.       Limit your exposure to cool or dry air.     Rest         Rest as much as possible.       Sleep with your head raised (elevated).       Make sure you get enough sleep each night.     General instructions            Apply a warm, moist washcloth to your face 3?4 times a day or as told by your health care provider. This will help with discomfort.       Wash your hands often with soap and water to reduce your exposure to germs. If soap and water are not available, use hand .      Do not  smoke. Avoid being around people who are smoking (secondhand smoke).       Keep all follow-up visits as told by your health care provider. This is important.         Contact a health care provider if:         You have a fever.       Your symptoms get worse.       Your symptoms do not improve within 10 days.     Get help right away if:         You have a severe headache.       You have persistent vomiting.       You have severe pain or swelling around your face or eyes.       You have vision problems.       You develop confusion.       Your neck is stiff.       You have trouble breathing.     Summary         Sinusitis is soreness and inflammation of your sinuses. Sinuses are hollow spaces in the bones around your face.       This condition is caused by nasal tissues that become inflamed or swollen. The swelling traps or blocks the flow of mucus. This allows bacteria, viruses, and fungi to grow, which leads to infection.       If you were prescribed an antibiotic medicine, take it as told by your health care  provider. Do not  stop taking the antibiotic even if you start to feel better.       Keep all follow-up visits as told by your health care provider. This is important.     This information is not intended to replace advice given to you by your health care provider. Make sure you discuss any questions you have with your health care provider.     Document Released: 12/18/2006Document Revised: 05/20/2019Document Reviewed: 05/20/2019     Elsevier Patient Education ? 2021 [employer]

## 2022-03-08 LAB
ALBUMIN SERPL-MCNC: 5.3 G/DL (ref 3.5–5.2)
ALBUMIN/GLOB SERPL: 1.7 G/DL
ALP SERPL-CCNC: 64 U/L (ref 39–117)
ALT SERPL W P-5'-P-CCNC: 16 U/L (ref 1–41)
ANION GAP SERPL CALCULATED.3IONS-SCNC: 13.6 MMOL/L (ref 5–15)
AST SERPL-CCNC: 18 U/L (ref 1–40)
BILIRUB SERPL-MCNC: 0.4 MG/DL (ref 0–1.2)
BUN SERPL-MCNC: 14 MG/DL (ref 6–20)
BUN/CREAT SERPL: 13 (ref 7–25)
CALCIUM SPEC-SCNC: 10.1 MG/DL (ref 8.6–10.5)
CHLORIDE SERPL-SCNC: 103 MMOL/L (ref 98–107)
CHOLEST SERPL-MCNC: 165 MG/DL (ref 0–200)
CK SERPL-CCNC: 104 U/L (ref 20–200)
CO2 SERPL-SCNC: 25.4 MMOL/L (ref 22–29)
CREAT SERPL-MCNC: 1.08 MG/DL (ref 0.76–1.27)
CRP SERPL-MCNC: 1.11 MG/DL (ref 0–0.5)
EGFRCR SERPLBLD CKD-EPI 2021: 96.5 ML/MIN/1.73
GLOBULIN UR ELPH-MCNC: 3.2 GM/DL
GLUCOSE SERPL-MCNC: 96 MG/DL (ref 65–99)
HDLC SERPL-MCNC: 60 MG/DL (ref 40–60)
LDLC SERPL CALC-MCNC: 97 MG/DL (ref 0–100)
LDLC/HDLC SERPL: 1.64 {RATIO}
POTASSIUM SERPL-SCNC: 4 MMOL/L (ref 3.5–5.2)
PROT SERPL-MCNC: 8.5 G/DL (ref 6–8.5)
SARS-COV-2 RNA NOSE QL NAA+PROBE: NOT DETECTED
SODIUM SERPL-SCNC: 142 MMOL/L (ref 136–145)
TRIGL SERPL-MCNC: 33 MG/DL (ref 0–150)
TSH SERPL DL<=0.05 MIU/L-ACNC: 0.95 UIU/ML (ref 0.27–4.2)
VLDLC SERPL-MCNC: 8 MG/DL (ref 5–40)

## 2022-03-09 LAB — ANA SER QL: NEGATIVE

## 2022-03-14 DIAGNOSIS — R79.82 ELEVATED C-REACTIVE PROTEIN (CRP): ICD-10-CM

## 2022-03-14 DIAGNOSIS — M62.82 NON-TRAUMATIC RHABDOMYOLYSIS: Primary | ICD-10-CM

## 2022-03-14 DIAGNOSIS — R70.0 ELEVATED SEDIMENTATION RATE: ICD-10-CM

## 2022-03-14 RX ORDER — CHOLECALCIFEROL (VITAMIN D3) 125 MCG
1 CAPSULE ORAL DAILY
Qty: 90 TABLET | Refills: 3 | Status: SHIPPED | OUTPATIENT
Start: 2022-03-14

## 2022-04-06 ENCOUNTER — OFFICE VISIT (OUTPATIENT)
Dept: FAMILY MEDICINE CLINIC | Facility: CLINIC | Age: 27
End: 2022-04-06

## 2022-04-06 ENCOUNTER — LAB (OUTPATIENT)
Dept: LAB | Facility: HOSPITAL | Age: 27
End: 2022-04-06

## 2022-04-06 VITALS
RESPIRATION RATE: 14 BRPM | WEIGHT: 145 LBS | DIASTOLIC BLOOD PRESSURE: 72 MMHG | BODY MASS INDEX: 20.3 KG/M2 | HEART RATE: 61 BPM | HEIGHT: 71 IN | TEMPERATURE: 97.3 F | SYSTOLIC BLOOD PRESSURE: 100 MMHG | OXYGEN SATURATION: 96 %

## 2022-04-06 DIAGNOSIS — Z72.0 CURRENT NICOTINE VAPING ON SOME DAYS: ICD-10-CM

## 2022-04-06 DIAGNOSIS — Z11.3 ROUTINE SCREENING FOR STI (SEXUALLY TRANSMITTED INFECTION): ICD-10-CM

## 2022-04-06 DIAGNOSIS — H69.83 DYSFUNCTION OF BOTH EUSTACHIAN TUBES: ICD-10-CM

## 2022-04-06 DIAGNOSIS — M62.82 NON-TRAUMATIC RHABDOMYOLYSIS: Primary | ICD-10-CM

## 2022-04-06 DIAGNOSIS — J45.990 EXERCISE-INDUCED ASTHMA: ICD-10-CM

## 2022-04-06 DIAGNOSIS — F12.90 MARIJUANA USE: ICD-10-CM

## 2022-04-06 DIAGNOSIS — Z00.00 PREVENTATIVE HEALTH CARE: ICD-10-CM

## 2022-04-06 PROBLEM — N17.9 AKI (ACUTE KIDNEY INJURY): Status: RESOLVED | Noted: 2020-06-26 | Resolved: 2022-04-06

## 2022-04-06 PROBLEM — E86.0 DEHYDRATION: Status: RESOLVED | Noted: 2020-06-26 | Resolved: 2022-04-06

## 2022-04-06 PROBLEM — E87.6 HYPOKALEMIA: Status: RESOLVED | Noted: 2020-06-26 | Resolved: 2022-04-06

## 2022-04-06 LAB
HBV SURFACE AG SERPL QL IA: NORMAL
HCV AB SER DONR QL: NORMAL
HIV1+2 AB SER QL: NORMAL

## 2022-04-06 PROCEDURE — 99395 PREV VISIT EST AGE 18-39: CPT | Performed by: PHYSICIAN ASSISTANT

## 2022-04-06 PROCEDURE — 2014F MENTAL STATUS ASSESS: CPT | Performed by: PHYSICIAN ASSISTANT

## 2022-04-06 PROCEDURE — 86706 HEP B SURFACE ANTIBODY: CPT

## 2022-04-06 PROCEDURE — 3008F BODY MASS INDEX DOCD: CPT | Performed by: PHYSICIAN ASSISTANT

## 2022-04-06 PROCEDURE — G0432 EIA HIV-1/HIV-2 SCREEN: HCPCS

## 2022-04-06 PROCEDURE — 87340 HEPATITIS B SURFACE AG IA: CPT

## 2022-04-06 PROCEDURE — 86592 SYPHILIS TEST NON-TREP QUAL: CPT

## 2022-04-06 PROCEDURE — 86803 HEPATITIS C AB TEST: CPT

## 2022-04-06 RX ORDER — FLUTICASONE PROPIONATE 50 MCG
2 SPRAY, SUSPENSION (ML) NASAL DAILY
Qty: 18.8 ML | Refills: 11 | Status: SHIPPED | OUTPATIENT
Start: 2022-04-06

## 2022-04-06 NOTE — PROGRESS NOTES
Reason for visit    Cj Lerma III is a 26 y.o. male who presents for annual comprehensive exam.    Chief Complaint   Patient presents with   • Annual Exam       HPI     Here for physical. History of recurrent nontraumatic rhabdomyolysis, exercise-induced asthma.  He was referred to rheumatology last visit but does not have an appointment yet.  Denies recurrent episodes of rhabdomyolysis since his last visit but he continues to play soccer and is golf.  Left ear still feels full after URI last month.    Diet/Physical activity:  -Reports unhealthy diet  -Plays frisbee golf and soccer for exercise    Immunizations:  -Declines COVID immunization  -Unclear timing of last tetanus vaccine    Cancer screening:   -Positive Fhx: lung cancer  -Negative Fhx: testicular, prostate, colon cancer, colon polyps    Depression: PHQ-2 Depression Screening  -Negative    Substance use:  -Vapes daily, former social cigarette smoking  -Marijuana use daily  -Rare alcohol use  -Denies caffeine use    Sexual health:  -Sexually active 1 partner x 1.5 yrs, female partner  -Does not use protection  -Denies prior STI screening    Dental/vision/skin:  -Current with eye exam  -Overdue for dental exam  -Denies new/changing/concerning skin lesions      Past Medical History:   Diagnosis Date   • Exercise-induced asthma        Past Surgical History:   Procedure Laterality Date   • KNEE ACL RECONSTRUCTION Left    • TONSILLECTOMY     • WISDOM TOOTH EXTRACTION         Family History   Problem Relation Age of Onset   • Thyroid disease Mother    • Hypertension Father    • Breast cancer Paternal Aunt    • Lung cancer Maternal Grandfather    • Lung cancer Paternal Grandfather    • Hypertension Paternal Grandfather        Social History     Socioeconomic History   • Marital status: Single   Tobacco Use   • Smoking status: Former Smoker     Types: Cigarettes, Electronic Cigarette   • Smokeless tobacco: Never Used   • Tobacco comment: socially   Vaping  Use   • Vaping Use: Every day   • Substances: Nicotine, THC, CBD, Flavoring   • Devices: Disposable   Substance and Sexual Activity   • Alcohol use: Yes     Comment: socially   • Drug use: Not Currently     Types: Marijuana     Comment: occasionally   • Sexual activity: Defer       No Known Allergies    ROS    Review of Systems   Constitutional: Positive for appetite change (increasing with more activity lately) and fatigue (intermittently). Negative for unexpected weight loss.   HENT: Negative for congestion, hearing loss, sore throat, swollen glands and trouble swallowing.    Eyes: Negative for blurred vision and visual disturbance.   Respiratory: Negative for cough and shortness of breath.    Cardiovascular: Negative for chest pain and leg swelling.   Gastrointestinal: Positive for GERD (few days last week with ramen). Negative for abdominal pain, blood in stool, constipation, diarrhea and vomiting.   Endocrine: Negative for polydipsia.   Genitourinary: Negative for difficulty urinating, dysuria, hematuria, erectile dysfunction, scrotal swelling and testicular pain.   Musculoskeletal: Negative for arthralgias and myalgias.   Skin: Negative for rash and skin lesions.   Neurological: Negative for dizziness, numbness and headache.   Hematological: Negative for adenopathy.   Psychiatric/Behavioral: Negative for depressed mood. The patient is nervous/anxious (social settings).        Vitals:    04/06/22 1353   BP: 100/72   Pulse: 61   Resp: 14   Temp: 97.3 °F (36.3 °C)   SpO2: 96%     Body mass index is 20.22 kg/m².      Current Outpatient Medications:   •  albuterol (PROVENTIL HFA;VENTOLIN HFA) 108 (90 BASE) MCG/ACT inhaler, Inhale 2 puffs every 4 (four) hours as needed for wheezing., Disp: , Rfl:   •  Cetirizine HCl (ZYRTEC ALLERGY PO), Take  by mouth., Disp: , Rfl:   •  Cholecalciferol (Vitamin D3) 50 MCG (2000 UT) tablet, Take 1 tablet by mouth Daily., Disp: 90 tablet, Rfl: 3  •  fluticasone (Flonase) 50 MCG/ACT  nasal spray, 2 sprays into the nostril(s) as directed by provider Daily., Disp: 18.8 mL, Rfl: 11    PE    Physical Exam  Vitals reviewed.   Constitutional:       General: He is not in acute distress.     Appearance: He is well-developed.   HENT:      Head: Normocephalic and atraumatic.      Right Ear: Hearing normal. A middle ear effusion is present.      Left Ear: Hearing normal. A middle ear effusion is present.      Ears:      Comments: Trace serous effusions present, left greater than right     Nose:      Right Turbinates: Swollen.      Left Turbinates: Swollen.      Mouth/Throat:      Mouth: Mucous membranes are moist.      Dentition: Normal dentition.      Pharynx: Oropharynx is clear.   Eyes:      Extraocular Movements: Extraocular movements intact.      Conjunctiva/sclera: Conjunctivae normal.      Pupils: Pupils are equal, round, and reactive to light.      Comments:      Neck:      Thyroid: No thyroid mass or thyromegaly.      Vascular: No carotid bruit.   Cardiovascular:      Rate and Rhythm: Normal rate and regular rhythm.      Heart sounds: No murmur heard.    No friction rub.   Pulmonary:      Effort: Pulmonary effort is normal.      Breath sounds: Normal breath sounds.   Chest:   Breasts:      Right: No supraclavicular adenopathy.      Left: No supraclavicular adenopathy.       Abdominal:      General: Bowel sounds are normal. There is no abdominal bruit.      Palpations: Abdomen is soft. There is no mass.      Tenderness: There is no abdominal tenderness.   Musculoskeletal:         General: Normal range of motion.      Cervical back: Normal range of motion and neck supple.   Lymphadenopathy:      Cervical: No cervical adenopathy.      Upper Body:      Right upper body: No supraclavicular adenopathy.      Left upper body: No supraclavicular adenopathy.   Skin:     General: Skin is warm and dry.      Findings: No rash.      Nails: There is no clubbing.      Comments: No suspicious nevi   Neurological:       Mental Status: He is alert.      Gait: Gait normal.      Deep Tendon Reflexes: Reflexes normal.   Psychiatric:         Speech: Speech normal.         Behavior: Behavior normal.         A/P    Problem List Items Addressed This Visit        Advance Directives and General Issues    Marijuana use  -Counseled to avoid marijuana       Musculoskeletal and Injuries    Non-traumatic rhabdomyolysis - Primary  -No recurrent episodes since last visit.   -Lab results dated 3/11/2022 showed mildly elevated CRP and ESR  -I sent a message to my referral coordinator to assist with rheumatology scheduling       Pulmonary and Pneumonias    Exercise-induced asthma  -No current issues    Relevant Medications    albuterol (PROVENTIL HFA;VENTOLIN HFA) 108 (90 BASE) MCG/ACT inhaler       Other    Current nicotine vaping on some days  -Discussed possible respiratory complications and recommended avoiding vaping      Other Visit Diagnoses     Preventative health care      -Counseled patient regarding cancer screening, immunizations, healthy lifestyle, diet, maintaining a healthy weight, and exercise. Annual dental and eye exams were encouraged.  -Patient is going to obtain his immunization records from his previous PCP  -Return to clinic in 1 year for physical or sooner if needed      Routine screening for STI (sexually transmitted infection)      -Recommended use of barrier protection at all times    Relevant Orders    Hepatitis B Surface Antigen    Hepatitis B Surface Antibody    Hepatitis C Antibody    HIV-1 / O / 2 Ag / Antibody 4th Generation    RPR    Dysfunction of both eustachian tubes      -Trial Flonase daily  -Discussed air insufflation technique          Plan of care was reviewed with patient at the conclusion of today's visit. Education was provided regarding diagnoses, management, treatment plan, and the importance of keeping follow-up appointments. The patient was counseled regarding the risks, benefits, and possible  side-effects of treatment. Patient and/or family expresses understanding and agreement with the management plan.        DIANE Correa

## 2022-04-07 LAB
HBV SURFACE AB SER RIA-ACNC: NORMAL
RPR SER QL: NORMAL

## 2022-07-28 ENCOUNTER — OFFICE VISIT (OUTPATIENT)
Dept: FAMILY MEDICINE CLINIC | Facility: CLINIC | Age: 27
End: 2022-07-28

## 2022-07-28 ENCOUNTER — LAB (OUTPATIENT)
Dept: LAB | Facility: HOSPITAL | Age: 27
End: 2022-07-28

## 2022-07-28 VITALS
OXYGEN SATURATION: 98 % | WEIGHT: 145 LBS | DIASTOLIC BLOOD PRESSURE: 60 MMHG | BODY MASS INDEX: 20.3 KG/M2 | HEIGHT: 71 IN | SYSTOLIC BLOOD PRESSURE: 104 MMHG | HEART RATE: 70 BPM

## 2022-07-28 DIAGNOSIS — N28.9 RENAL INSUFFICIENCY: ICD-10-CM

## 2022-07-28 DIAGNOSIS — M62.82 NON-TRAUMATIC RHABDOMYOLYSIS: Primary | ICD-10-CM

## 2022-07-28 LAB
ANION GAP SERPL CALCULATED.3IONS-SCNC: 14 MMOL/L (ref 5–15)
BILIRUB UR QL STRIP: NEGATIVE
BUN SERPL-MCNC: 16 MG/DL (ref 6–20)
BUN/CREAT SERPL: 15.8 (ref 7–25)
CALCIUM SPEC-SCNC: 9.1 MG/DL (ref 8.6–10.5)
CHLORIDE SERPL-SCNC: 103 MMOL/L (ref 98–107)
CLARITY UR: CLEAR
CO2 SERPL-SCNC: 24 MMOL/L (ref 22–29)
COLOR UR: YELLOW
CREAT SERPL-MCNC: 1.01 MG/DL (ref 0.76–1.27)
EGFRCR SERPLBLD CKD-EPI 2021: 104.5 ML/MIN/1.73
GLUCOSE SERPL-MCNC: 103 MG/DL (ref 65–99)
GLUCOSE UR STRIP-MCNC: NEGATIVE MG/DL
HGB UR QL STRIP.AUTO: NEGATIVE
KETONES UR QL STRIP: NEGATIVE
LEUKOCYTE ESTERASE UR QL STRIP.AUTO: NEGATIVE
NITRITE UR QL STRIP: NEGATIVE
PH UR STRIP.AUTO: 6 [PH] (ref 5–8)
POTASSIUM SERPL-SCNC: 3.8 MMOL/L (ref 3.5–5.2)
PROT UR QL STRIP: NEGATIVE
SODIUM SERPL-SCNC: 141 MMOL/L (ref 136–145)
SP GR UR STRIP: 1.03 (ref 1–1.03)
UROBILINOGEN UR QL STRIP: NORMAL

## 2022-07-28 PROCEDURE — 80048 BASIC METABOLIC PNL TOTAL CA: CPT

## 2022-07-28 PROCEDURE — 81003 URINALYSIS AUTO W/O SCOPE: CPT

## 2022-07-28 PROCEDURE — 99213 OFFICE O/P EST LOW 20 MIN: CPT | Performed by: PHYSICIAN ASSISTANT

## 2022-07-28 NOTE — PROGRESS NOTES
"    Chief Complaint   Patient presents with   • Results     Discuss lab work and future treatment plan        HPI     Cj Lerma III is a pleasant 26 y.o. male with a PMH of nontraumatic recurrent rhabdomyolysis who presents for evaluation of \"chief complaint.\"     His creatinine was 1.4 on labs requested by his rheumatologist on 7/7. He was advised to follow-up here for this. Has increased his fluid intake and urine is clear. Denies myalgias. He had played soccer the day before his labs were obtained and states he probably over did it. His rheumatology at the [facility] referred him to neurology for their neuromuscular clinic. He has an appointment in January. He has been instructed to avoid excessive exertion.     Past Medical History:   Diagnosis Date   • Exercise-induced asthma        Past Surgical History:   Procedure Laterality Date   • KNEE ACL RECONSTRUCTION Left    • TONSILLECTOMY     • WISDOM TOOTH EXTRACTION         Family History   Problem Relation Age of Onset   • Thyroid disease Mother    • Hypertension Father    • Breast cancer Paternal Aunt    • Lung cancer Maternal Grandfather    • Lung cancer Paternal Grandfather    • Hypertension Paternal Grandfather        Social History     Socioeconomic History   • Marital status: Single   Tobacco Use   • Smoking status: Former Smoker     Types: Cigarettes, Electronic Cigarette   • Smokeless tobacco: Never Used   • Tobacco comment: socially   Vaping Use   • Vaping Use: Every day   • Substances: Nicotine, THC, CBD, Flavoring   • Devices: Disposable   Substance and Sexual Activity   • Alcohol use: Yes     Comment: socially   • Drug use: Not Currently     Types: Marijuana     Comment: occasionally   • Sexual activity: Defer       No Known Allergies    ROS    Review of Systems   Genitourinary: Negative for decreased urine volume, dysuria, flank pain and hematuria.   Musculoskeletal: Negative for myalgias.       Vitals:    07/28/22 1025   BP: " 104/60   Pulse: 70   SpO2: 98%     Body mass index is 20.22 kg/m².      Current Outpatient Medications:   •  albuterol (PROVENTIL HFA;VENTOLIN HFA) 108 (90 BASE) MCG/ACT inhaler, Inhale 2 puffs every 4 (four) hours as needed for wheezing., Disp: , Rfl:   •  Cetirizine HCl (ZYRTEC ALLERGY PO), Take  by mouth., Disp: , Rfl:   •  Cholecalciferol (Vitamin D3) 50 MCG (2000 UT) tablet, Take 1 tablet by mouth Daily., Disp: 90 tablet, Rfl: 3  •  fluticasone (Flonase) 50 MCG/ACT nasal spray, 2 sprays into the nostril(s) as directed by provider Daily., Disp: 18.8 mL, Rfl: 11    PE    Physical Exam  Vitals reviewed.   Constitutional:       General: He is not in acute distress.  Pulmonary:      Effort: Pulmonary effort is normal. No respiratory distress.   Neurological:      Mental Status: He is alert.   Psychiatric:         Mood and Affect: Mood normal.          A/P    Problem List Items Addressed This Visit        Musculoskeletal and Injuries    Non-traumatic rhabdomyolysis - Primary  -Follow-up with neurology at the [facility] as scheduled in January  -Also has rheumatology follow-up a few days after he sees neurology  -?metabolic myopathy  -RTC in March for a physical or sooner if needed      Other Visit Diagnoses     Renal insufficiency      -Likely related to dehydration, baseline creatinine 1.0  -Repeat BMP and UA    Relevant Orders    Basic Metabolic Panel    Urinalysis With Culture If Indicated - Urine, Clean Catch          Plan of care was reviewed with patient at the conclusion of today's visit. Education was provided regarding diagnoses, management, prescribed or recommended OTC products, and the importance of compliance with follow-up appointments. The patient was counseled regarding the risks, benefits, and possible side-effects of treatment. I advised the patient to keep me informed of any acute changes in their status including new, worsening, or persistent symptoms. Patient expresses understanding  and agreement with the management plan.        DIANE Correa  Answers for HPI/ROS submitted by the patient on 7/28/2022  What is the primary reason for your visit?: Other  Please describe your symptoms.: No symptoms, a little tired.  Have you had these symptoms before?: Yes  How long have you been having these symptoms?: Greater than 2 weeks

## 2023-04-03 ENCOUNTER — PATIENT MESSAGE (OUTPATIENT)
Dept: FAMILY MEDICINE CLINIC | Facility: CLINIC | Age: 28
End: 2023-04-03
Payer: COMMERCIAL

## 2023-04-07 ENCOUNTER — LAB (OUTPATIENT)
Dept: LAB | Facility: HOSPITAL | Age: 28
End: 2023-04-07
Payer: COMMERCIAL

## 2023-04-07 ENCOUNTER — OFFICE VISIT (OUTPATIENT)
Dept: FAMILY MEDICINE CLINIC | Facility: CLINIC | Age: 28
End: 2023-04-07
Payer: COMMERCIAL

## 2023-04-07 VITALS
WEIGHT: 169 LBS | DIASTOLIC BLOOD PRESSURE: 68 MMHG | SYSTOLIC BLOOD PRESSURE: 102 MMHG | HEART RATE: 93 BPM | OXYGEN SATURATION: 98 % | BODY MASS INDEX: 23.66 KG/M2 | HEIGHT: 71 IN

## 2023-04-07 DIAGNOSIS — M62.82 NON-TRAUMATIC RHABDOMYOLYSIS: Primary | ICD-10-CM

## 2023-04-07 DIAGNOSIS — Z00.00 PREVENTATIVE HEALTH CARE: ICD-10-CM

## 2023-04-07 DIAGNOSIS — M62.82 NON-TRAUMATIC RHABDOMYOLYSIS: ICD-10-CM

## 2023-04-07 LAB
DEPRECATED RDW RBC AUTO: 38.7 FL (ref 37–54)
ERYTHROCYTE [DISTWIDTH] IN BLOOD BY AUTOMATED COUNT: 12 % (ref 12.3–15.4)
HCT VFR BLD AUTO: 45 % (ref 37.5–51)
HGB BLD-MCNC: 15.8 G/DL (ref 13–17.7)
MCH RBC QN AUTO: 30.9 PG (ref 26.6–33)
MCHC RBC AUTO-ENTMCNC: 35.1 G/DL (ref 31.5–35.7)
MCV RBC AUTO: 87.9 FL (ref 79–97)
PLATELET # BLD AUTO: 267 10*3/MM3 (ref 140–450)
PMV BLD AUTO: 11.6 FL (ref 6–12)
RBC # BLD AUTO: 5.12 10*6/MM3 (ref 4.14–5.8)
WBC NRBC COR # BLD: 6.54 10*3/MM3 (ref 3.4–10.8)

## 2023-04-07 PROCEDURE — 82550 ASSAY OF CK (CPK): CPT

## 2023-04-07 PROCEDURE — 80061 LIPID PANEL: CPT

## 2023-04-07 PROCEDURE — 85027 COMPLETE CBC AUTOMATED: CPT

## 2023-04-07 PROCEDURE — 84443 ASSAY THYROID STIM HORMONE: CPT

## 2023-04-07 PROCEDURE — 81003 URINALYSIS AUTO W/O SCOPE: CPT

## 2023-04-07 PROCEDURE — 80053 COMPREHEN METABOLIC PANEL: CPT

## 2023-04-07 RX ORDER — ALBUTEROL SULFATE 90 UG/1
2 AEROSOL, METERED RESPIRATORY (INHALATION) EVERY 4 HOURS PRN
Status: CANCELLED | OUTPATIENT
Start: 2023-04-07

## 2023-04-07 NOTE — ASSESSMENT & PLAN NOTE
I encouraged the patient to schedule follow-up appointments with both rheumatology and neurology at the [facility].  He was advised to present to the office or the ER urgently for kidney function monitoring if he has recurrent episodes.  Discussed avoiding dehydration.  Please schedule physical for 1 month.

## 2023-04-07 NOTE — PROGRESS NOTES
"    Chief Complaint   Patient presents with   • Spasms     Rhabdomyolysis episode       HPI     Cj Lerma III is a pleasant 27 y.o. male with a PMH of exercise induced rhabdomyolysis who presents for evaluation of \"chief complaint.\"     Patient was initially scheduled for a physical today but has some concerns he would like to discuss. He has been living in [place] since [place] and [place] but prior to that time was staying in [place] with a family member.     He reports another episode of paresthesias in his muscles locking up after pushing himself playing soccer last week.  He had not had anything to eat that morning.  He states his hand started to go numb and lock up then paresthesias spread to his face, hips, and abdomen like his similar episodes. He did get out of the car and lie down on the ground. Some people nearby brought him some water and crackers which helped quickly. He has felt much better the last few days.  He reports aside from this most recent episode he had not had any problems.  He thinks gaining some weight and has helped. He is established with neurology and rheumatology at the [facility] but states he missed appointments in January and has not rescheduled them yet.  He had difficulty with transportation issues when he was living in [place] but is now back in [place] locally and has his own vehicle. He had a normal EMG in October last year. He denies current muscular complaints, fatigue, weakness, and dark-colored urine.    Past Medical History:   Diagnosis Date   • Allergic Seasonal my whole life   • Exercise-induced asthma    • Headache 3/15/23    Immediate migraine 5 minutes into exercise       Past Surgical History:   Procedure Laterality Date   • KNEE ACL RECONSTRUCTION Left    • TONSILLECTOMY     • WISDOM TOOTH EXTRACTION         Family History   Problem Relation Age of Onset   • Thyroid disease Mother    • Hypertension Father    • Breast cancer " Paternal Aunt    • Lung cancer Maternal Grandfather    • Lung cancer Paternal Grandfather    • Hypertension Paternal Grandfather        Social History     Socioeconomic History   • Marital status: Single   Tobacco Use   • Smoking status: Some Days     Types: Electronic Cigarette   • Smokeless tobacco: Never   • Tobacco comments:     socially   Vaping Use   • Vaping Use: Every day   • Substances: Nicotine, THC, CBD, Flavoring   • Devices: Disposable   Substance and Sexual Activity   • Alcohol use: Yes     Comment: Very seldom   • Drug use: Never     Types: Marijuana     Comment: occasionally   • Sexual activity: Yes     Partners: Female     Birth control/protection: I.U.D.       No Known Allergies    ROS    Review of Systems   Constitutional: Negative for fatigue.   Genitourinary: Negative for difficulty urinating, dysuria, frequency and hematuria.   Musculoskeletal: Negative for myalgias.   Neurological: Negative for weakness.       Vitals:    04/07/23 1401   BP: 102/68   Pulse: 93   SpO2: 98%     Body mass index is 23.57 kg/m².      Current Outpatient Medications:   •  albuterol (PROVENTIL HFA;VENTOLIN HFA) 108 (90 BASE) MCG/ACT inhaler, Inhale 2 puffs Every 4 (Four) Hours As Needed for Wheezing., Disp: , Rfl:   •  Cetirizine HCl (ZYRTEC ALLERGY PO), Take  by mouth., Disp: , Rfl:     PE    Physical Exam  Vitals reviewed.   Constitutional:       General: He is not in acute distress.     Appearance: He is well-developed.   HENT:      Head: Normocephalic and atraumatic.   Eyes:      Conjunctiva/sclera: Conjunctivae normal.   Cardiovascular:      Rate and Rhythm: Normal rate and regular rhythm.      Heart sounds: Normal heart sounds. No murmur heard.  Pulmonary:      Effort: Pulmonary effort is normal.      Breath sounds: Normal breath sounds.   Musculoskeletal:      Cervical back: Normal range of motion.   Skin:     General: Skin is warm and dry.   Neurological:      Mental Status: He is alert.      Gait: Gait normal.    Psychiatric:         Speech: Speech normal.         Behavior: Behavior normal.          A/P    Problem List Items Addressed This Visit        Musculoskeletal and Injuries    Non-traumatic rhabdomyolysis - Primary    Current Assessment & Plan     I encouraged the patient to schedule follow-up appointments with both rheumatology and neurology at the [facility].  He was advised to present to the office or the ER urgently for kidney function monitoring if he has recurrent episodes.  Discussed avoiding dehydration.  Please schedule physical for 1 month.         Relevant Orders    CK   Other Visit Diagnoses     Preventative health care        Relevant Orders    CBC (No Diff)    Comprehensive Metabolic Panel    Lipid Panel    TSH Rfx On Abnormal To Free T4    Urinalysis With Culture If Indicated - Urine, Clean Catch          Plan of care was reviewed with patient at the conclusion of today's visit. Education was provided regarding diagnoses, management, prescribed or recommended OTC products, and the importance of compliance with follow-up appointments. The patient was counseled regarding the risks, benefits, and possible side-effects of treatment. I advised the patient to keep me informed of any acute changes in their status including new, worsening, or persistent symptoms. Patient expresses understanding and agreement with the management plan.        DIANE Correa

## 2023-04-08 LAB
ALBUMIN SERPL-MCNC: 5.2 G/DL (ref 3.5–5.2)
ALBUMIN/GLOB SERPL: 1.9 G/DL
ALP SERPL-CCNC: 61 U/L (ref 39–117)
ALT SERPL W P-5'-P-CCNC: 15 U/L (ref 1–41)
ANION GAP SERPL CALCULATED.3IONS-SCNC: 10.4 MMOL/L (ref 5–15)
AST SERPL-CCNC: 15 U/L (ref 1–40)
BILIRUB SERPL-MCNC: 0.2 MG/DL (ref 0–1.2)
BILIRUB UR QL STRIP: NEGATIVE
BUN SERPL-MCNC: 17 MG/DL (ref 6–20)
BUN/CREAT SERPL: 16 (ref 7–25)
CALCIUM SPEC-SCNC: 10.1 MG/DL (ref 8.6–10.5)
CHLORIDE SERPL-SCNC: 102 MMOL/L (ref 98–107)
CHOLEST SERPL-MCNC: 178 MG/DL (ref 0–200)
CK SERPL-CCNC: 112 U/L (ref 20–200)
CLARITY UR: CLEAR
CO2 SERPL-SCNC: 25.6 MMOL/L (ref 22–29)
COLOR UR: YELLOW
CREAT SERPL-MCNC: 1.06 MG/DL (ref 0.76–1.27)
EGFRCR SERPLBLD CKD-EPI 2021: 98 ML/MIN/1.73
GLOBULIN UR ELPH-MCNC: 2.7 GM/DL
GLUCOSE SERPL-MCNC: 90 MG/DL (ref 65–99)
GLUCOSE UR STRIP-MCNC: NEGATIVE MG/DL
HDLC SERPL-MCNC: 54 MG/DL (ref 40–60)
HGB UR QL STRIP.AUTO: NEGATIVE
KETONES UR QL STRIP: NEGATIVE
LDLC SERPL CALC-MCNC: 112 MG/DL (ref 0–100)
LDLC/HDLC SERPL: 2.06 {RATIO}
LEUKOCYTE ESTERASE UR QL STRIP.AUTO: NEGATIVE
NITRITE UR QL STRIP: NEGATIVE
PH UR STRIP.AUTO: 6.5 [PH] (ref 5–8)
POTASSIUM SERPL-SCNC: 3.8 MMOL/L (ref 3.5–5.2)
PROT SERPL-MCNC: 7.9 G/DL (ref 6–8.5)
PROT UR QL STRIP: NEGATIVE
SODIUM SERPL-SCNC: 138 MMOL/L (ref 136–145)
SP GR UR STRIP: 1.01 (ref 1–1.03)
TRIGL SERPL-MCNC: 63 MG/DL (ref 0–150)
TSH SERPL DL<=0.05 MIU/L-ACNC: 1.24 UIU/ML (ref 0.27–4.2)
UROBILINOGEN UR QL STRIP: NORMAL
VLDLC SERPL-MCNC: 12 MG/DL (ref 5–40)

## 2023-04-18 ENCOUNTER — TELEPHONE (OUTPATIENT)
Dept: FAMILY MEDICINE CLINIC | Facility: CLINIC | Age: 28
End: 2023-04-18
Payer: COMMERCIAL

## 2023-04-18 NOTE — LETTER
April 21, 2023      Cj Lerma III    [address] [place] [id number]        Dear Mr. Lerma    Below are the results from your recent visit:    Lab on 04/07/2023   Component Date Value Ref Range Status   • WBC 04/07/2023 6.54  3.40 - 10.80 10*3/mm3 Final   • RBC 04/07/2023 5.12  4.14 - 5.80 10*6/mm3 Final   • Hemoglobin 04/07/2023 15.8  13.0 - 17.7 g/dL Final   • Hematocrit 04/07/2023 45.0  37.5 - 51.0 % Final   • MCV 04/07/2023 87.9  79.0 - 97.0 fL Final   • MCH 04/07/2023 30.9  26.6 - 33.0 pg Final   • MCHC 04/07/2023 35.1  31.5 - 35.7 g/dL Final   • RDW 04/07/2023 12.0 (L)  12.3 - 15.4 % Final   • RDW-SD 04/07/2023 38.7  37.0 - 54.0 fl Final   • MPV 04/07/2023 11.6  6.0 - 12.0 fL Final   • Platelets 04/07/2023 267  140 - 450 10*3/mm3 Final   • Glucose 04/07/2023 90  65 - 99 mg/dL Final   • BUN 04/07/2023 17  6 - 20 mg/dL Final   • Creatinine 04/07/2023 1.06  0.76 - 1.27 mg/dL Final   • Sodium 04/07/2023 138  136 - 145 mmol/L Final   • Potassium 04/07/2023 3.8  3.5 - 5.2 mmol/L Final   • Chloride 04/07/2023 102  98 - 107 mmol/L Final   • CO2 04/07/2023 25.6  22.0 - 29.0 mmol/L Final   • Calcium 04/07/2023 10.1  8.6 - 10.5 mg/dL Final   • Total Protein 04/07/2023 7.9  6.0 - 8.5 g/dL Final   • Albumin 04/07/2023 5.2  3.5 - 5.2 g/dL Final   • ALT (SGPT) 04/07/2023 15  1 - 41 U/L Final   • AST (SGOT) 04/07/2023 15  1 - 40 U/L Final   • Alkaline Phosphatase 04/07/2023 61  39 - 117 U/L Final   • Total Bilirubin 04/07/2023 0.2  0.0 - 1.2 mg/dL Final   • Globulin 04/07/2023 2.7  gm/dL Final   • A/G Ratio 04/07/2023 1.9  g/dL Final   • BUN/Creatinine Ratio 04/07/2023 16.0  7.0 - 25.0 Final   • Anion Gap 04/07/2023 10.4  5.0 - 15.0 mmol/L Final   • eGFR 04/07/2023 98.0  >60.0 mL/min/1.73 Final   • Total Cholesterol 04/07/2023 178  0 - 200 mg/dL Final   • Triglycerides 04/07/2023 63  0 - 150 mg/dL Final   • HDL Cholesterol 04/07/2023 54  40 - 60 mg/dL Final   • LDL Cholesterol  04/07/2023 112 (H)  0 - 100  mg/dL Final   • VLDL Cholesterol 04/07/2023 12  5 - 40 mg/dL Final   • LDL/HDL Ratio 04/07/2023 2.06   Final   • TSH 04/07/2023 1.240  0.270 - 4.200 uIU/mL Final   • Color, UA 04/07/2023 Yellow  Yellow, Straw Final   • Appearance, UA 04/07/2023 Clear  Clear Final   • pH, UA 04/07/2023 6.5  5.0 - 8.0 Final   • Specific Gravity, UA 04/07/2023 1.013  1.005 - 1.030 Final   • Glucose, UA 04/07/2023 Negative  Negative Final   • Ketones, UA 04/07/2023 Negative  Negative Final   • Bilirubin, UA 04/07/2023 Negative  Negative Final   • Blood, UA 04/07/2023 Negative  Negative Final   • Protein, UA 04/07/2023 Negative  Negative Final   • Leuk Esterase, UA 04/07/2023 Negative  Negative Final   • Nitrite, UA 04/07/2023 Negative  Negative Final   • Urobilinogen, UA 04/07/2023 0.2 E.U./dL  0.2 - 1.0 E.U./dL Final   • Creatine Kinase 04/07/2023 112  20 - 200 U/L Final     Cj, these lab results are normal aside from mildly high bad cholesterol (LDL). For this I recommend reducing saturated fats in your diet (animal products) and regular moderate intensity exercise. Kidney function is stable compared to previous measurements. The creatinine kinase which is a marker of muscle breakdown is normal which is reassuring.  Please let me know if you have questions.            Sincerely,      DIANE Correa

## 2023-04-18 NOTE — TELEPHONE ENCOUNTER
Attempted to contact, no answer left message asking for return call    Cj, these lab results are normal aside from mildly high bad cholesterol (LDL). For this I recommend reducing saturated fats in your diet (animal products) and regular moderate intensity exercise. Kidney function is stable compared to previous measurements. The creatinine kinase which is a marker of muscle breakdown is normal which is reassuring.  Please let me know if you have questions.  Hub can relay and document.

## 2023-04-26 ENCOUNTER — OFFICE VISIT (OUTPATIENT)
Dept: FAMILY MEDICINE CLINIC | Facility: CLINIC | Age: 28
End: 2023-04-26
Payer: COMMERCIAL

## 2023-04-26 ENCOUNTER — HOSPITAL ENCOUNTER (EMERGENCY)
Facility: HOSPITAL | Age: 28
Discharge: HOME | End: 2023-04-26
Attending: EMERGENCY MEDICINE
Payer: COMMERCIAL

## 2023-04-26 VITALS
HEIGHT: 71 IN | SYSTOLIC BLOOD PRESSURE: 137 MMHG | RESPIRATION RATE: 16 BRPM | TEMPERATURE: 97.9 F | WEIGHT: 170 LBS | HEART RATE: 54 BPM | DIASTOLIC BLOOD PRESSURE: 82 MMHG | OXYGEN SATURATION: 99 % | BODY MASS INDEX: 23.8 KG/M2

## 2023-04-26 VITALS
HEART RATE: 67 BPM | SYSTOLIC BLOOD PRESSURE: 112 MMHG | WEIGHT: 167.2 LBS | BODY MASS INDEX: 23.41 KG/M2 | HEIGHT: 71 IN | OXYGEN SATURATION: 98 % | DIASTOLIC BLOOD PRESSURE: 72 MMHG

## 2023-04-26 DIAGNOSIS — M62.82 NON-TRAUMATIC RHABDOMYOLYSIS: Primary | ICD-10-CM

## 2023-04-26 DIAGNOSIS — R31.9 HEMATURIA, UNSPECIFIED TYPE: ICD-10-CM

## 2023-04-26 DIAGNOSIS — M62.838 MUSCLE SPASM: Primary | ICD-10-CM

## 2023-04-26 DIAGNOSIS — R80.9 PROTEINURIA, UNSPECIFIED TYPE: ICD-10-CM

## 2023-04-26 DIAGNOSIS — E86.9 VOLUME DEPLETION: ICD-10-CM

## 2023-04-26 LAB
ALBUMIN SERPL-MCNC: 5 G/DL (ref 3.5–5.2)
ALBUMIN/GLOB SERPL: 2 G/DL
ALP SERPL-CCNC: 58 U/L (ref 39–117)
ALT SERPL W P-5'-P-CCNC: 18 U/L (ref 1–41)
ANION GAP SERPL CALCULATED.3IONS-SCNC: 14 MMOL/L (ref 5–15)
AST SERPL-CCNC: 29 U/L (ref 1–40)
BASOPHILS # BLD AUTO: 0.05 10*3/MM3 (ref 0–0.2)
BASOPHILS NFR BLD AUTO: 0.4 % (ref 0–1.5)
BILIRUB BLD-MCNC: NEGATIVE MG/DL
BILIRUB SERPL-MCNC: 0.7 MG/DL (ref 0–1.2)
BUN SERPL-MCNC: 19 MG/DL (ref 6–20)
BUN/CREAT SERPL: 15.8 (ref 7–25)
CALCIUM SPEC-SCNC: 9.8 MG/DL (ref 8.6–10.5)
CHLORIDE SERPL-SCNC: 103 MMOL/L (ref 98–107)
CLARITY, POC: CLEAR
CO2 SERPL-SCNC: 21 MMOL/L (ref 22–29)
COLOR UR: YELLOW
CREAT SERPL-MCNC: 1.2 MG/DL (ref 0.76–1.27)
DEPRECATED RDW RBC AUTO: 37.7 FL (ref 37–54)
EGFRCR SERPLBLD CKD-EPI 2021: 84.5 ML/MIN/1.73
EOSINOPHIL # BLD AUTO: 0.04 10*3/MM3 (ref 0–0.4)
EOSINOPHIL NFR BLD AUTO: 0.3 % (ref 0.3–6.2)
ERYTHROCYTE [DISTWIDTH] IN BLOOD BY AUTOMATED COUNT: 11.8 % (ref 12.3–15.4)
EXPIRATION DATE: ABNORMAL
GLOBULIN UR ELPH-MCNC: 2.5 GM/DL
GLUCOSE SERPL-MCNC: 104 MG/DL (ref 65–99)
GLUCOSE UR STRIP-MCNC: NEGATIVE MG/DL
HCT VFR BLD AUTO: 43 % (ref 37.5–51)
HGB BLD-MCNC: 15.1 G/DL (ref 13–17.7)
HOLD SPECIMEN: NORMAL
IMM GRANULOCYTES # BLD AUTO: 0.03 10*3/MM3 (ref 0–0.05)
IMM GRANULOCYTES NFR BLD AUTO: 0.2 % (ref 0–0.5)
KETONES UR QL: NEGATIVE
LEUKOCYTE EST, POC: ABNORMAL
LYMPHOCYTES # BLD AUTO: 2.96 10*3/MM3 (ref 0.7–3.1)
LYMPHOCYTES NFR BLD AUTO: 20.8 % (ref 19.6–45.3)
Lab: ABNORMAL
MCH RBC QN AUTO: 30.6 PG (ref 26.6–33)
MCHC RBC AUTO-ENTMCNC: 35.1 G/DL (ref 31.5–35.7)
MCV RBC AUTO: 87 FL (ref 79–97)
MONOCYTES # BLD AUTO: 1 10*3/MM3 (ref 0.1–0.9)
MONOCYTES NFR BLD AUTO: 7 % (ref 5–12)
NEUTROPHILS NFR BLD AUTO: 10.18 10*3/MM3 (ref 1.7–7)
NEUTROPHILS NFR BLD AUTO: 71.3 % (ref 42.7–76)
NITRITE UR-MCNC: NEGATIVE MG/ML
NRBC BLD AUTO-RTO: 0 /100 WBC (ref 0–0.2)
PH UR: 6 [PH] (ref 5–8)
PLATELET # BLD AUTO: 253 10*3/MM3 (ref 140–450)
PMV BLD AUTO: 11.1 FL (ref 6–12)
POTASSIUM SERPL-SCNC: 3.5 MMOL/L (ref 3.5–5.2)
PROT SERPL-MCNC: 7.5 G/DL (ref 6–8.5)
PROT UR STRIP-MCNC: ABNORMAL MG/DL
RBC # BLD AUTO: 4.94 10*6/MM3 (ref 4.14–5.8)
RBC # UR STRIP: ABNORMAL /UL
SODIUM SERPL-SCNC: 138 MMOL/L (ref 136–145)
SP GR UR: 1.01 (ref 1–1.03)
UROBILINOGEN UR QL: NORMAL
WBC NRBC COR # BLD: 14.26 10*3/MM3 (ref 3.4–10.8)
WHOLE BLOOD HOLD COAG: NORMAL
WHOLE BLOOD HOLD SPECIMEN: NORMAL

## 2023-04-26 PROCEDURE — 80053 COMPREHEN METABOLIC PANEL: CPT | Performed by: EMERGENCY MEDICINE

## 2023-04-26 PROCEDURE — 99282 EMERGENCY DEPT VISIT SF MDM: CPT

## 2023-04-26 PROCEDURE — 85025 COMPLETE CBC W/AUTO DIFF WBC: CPT | Performed by: EMERGENCY MEDICINE

## 2023-04-26 PROCEDURE — 36415 COLL VENOUS BLD VENIPUNCTURE: CPT

## 2023-04-26 NOTE — ED PROVIDER NOTES
"Subjective   History of Present Illness  19-year-old male presents emergency department today with hematuria and proteinuria sent from the [facility].  He reports he has had recurrent history of carpopedal spasms during exercise.  He states he drinks a lot of fluids and folic he was well-hydrated however he was out playing soccer for about 3 hours today when he began having these again.  He has been seen by multiple specialists but has not been seen by urology for the proteinuria and hematuria.  This proteinuria and hematuria is all been microscopic.  He has had no abdominal pain no nausea no vomiting.  Denies diarrhea.  No other complaints.  Reports he been able to keep fluids down strength multiple things of water since the event has occurred even took some \"salt pills\".    History provided by:  Patient and relative   used: No    Hand Pain  Location:  Lateral hand cramping during exercise this has been a repetitive problem for several years.  It occurs after exercise.  Severity:  Severe  Onset quality:  Sudden  Timing:  Constant  Progression:  Worsening  Chronicity:  Recurrent  Context:  Always occurs during exercise.  Relieved by:  Fluids rest  Worsened by:  Exercise  Associated symptoms: no abdominal pain, no cough, no fever, no loss of consciousness, no rhinorrhea, no vomiting and no wheezing        Review of Systems   Constitutional: Negative for fever.   HENT: Negative for rhinorrhea.    Respiratory: Negative for cough and wheezing.    Gastrointestinal: Negative for abdominal pain and vomiting.   Neurological: Negative for loss of consciousness.   All other systems reviewed and are negative.      Past Medical History:   Diagnosis Date   • Allergic Seasonal my whole life   • Exercise-induced asthma    • Headache 3/15/23    Immediate migraine 5 minutes into exercise       No Known Allergies    Past Surgical History:   Procedure Laterality Date   • KNEE ACL RECONSTRUCTION Left    • TONSILLECTOMY   "   • WISDOM TOOTH EXTRACTION         Family History   Problem Relation Age of Onset   • Thyroid disease Mother    • Hypertension Father    • Breast cancer Paternal Aunt    • Lung cancer Maternal Grandfather    • Lung cancer Paternal Grandfather    • Hypertension Paternal Grandfather        Social History     Socioeconomic History   • Marital status: Single   Tobacco Use   • Smoking status: Some Days     Types: Electronic Cigarette   • Smokeless tobacco: Never   • Tobacco comments:     socially   Vaping Use   • Vaping Use: Every day   • Substances: Nicotine, THC, CBD, Flavoring   • Devices: Disposable   Substance and Sexual Activity   • Alcohol use: Yes     Comment: Very seldom   • Drug use: Never     Types: Marijuana     Comment: occasionally   • Sexual activity: Yes     Partners: Female     Birth control/protection: I.U.D.           Objective   Physical Exam  Vitals and nursing note reviewed.   Constitutional:       Appearance: He is well-developed.   HENT:      Head: Normocephalic and atraumatic.      Right Ear: External ear normal.      Left Ear: External ear normal.      Nose: Nose normal.   Eyes:      General: No scleral icterus.     Conjunctiva/sclera: Conjunctivae normal.      Pupils: Pupils are equal, round, and reactive to light.   Neck:      Thyroid: No thyromegaly.   Cardiovascular:      Rate and Rhythm: Normal rate and regular rhythm.      Heart sounds: Normal heart sounds.   Pulmonary:      Effort: Pulmonary effort is normal. No respiratory distress.      Breath sounds: Normal breath sounds. No wheezing or rales.   Chest:      Chest wall: No tenderness.   Abdominal:      General: Bowel sounds are normal. There is no distension.      Palpations: Abdomen is soft.      Tenderness: There is no abdominal tenderness.   Musculoskeletal:         General: Normal range of motion.      Cervical back: Normal range of motion.      Comments: Negative Tinel's at the elbow and wrist.   Lymphadenopathy:      Cervical: No  cervical adenopathy.   Skin:     General: Skin is warm and dry.   Neurological:      Mental Status: He is alert and oriented to person, place, and time.      Cranial Nerves: No cranial nerve deficit.      Coordination: Coordination normal.      Deep Tendon Reflexes: Reflexes are normal and symmetric. Reflexes normal.      Comments: Negative Trousseau's   Psychiatric:         Behavior: Behavior normal.         Thought Content: Thought content normal.         Judgment: Judgment normal.         Procedures           ED Course                                           Medical Decision Making  Recurrent problem muscle spasms with exercise.  Has been drinking fluids eating problem drinking fluids here.  Is worried he microscopic hematuria proteinuria noted on the dipstick we discussed could possibly do just be from exercise.  He would like further evaluation he will be sent to urology.  Also advised follow-up with his primary care Robe childers.    Hematuria, unspecified type: complicated acute illness or injury  Muscle spasm: complicated acute illness or injury  Proteinuria, unspecified type: complicated acute illness or injury  Volume depletion: complicated acute illness or injury  Amount and/or Complexity of Data Reviewed  Labs: ordered.          Final diagnoses:   Muscle spasm   Volume depletion   Proteinuria, unspecified type   Hematuria, unspecified type       ED Disposition  ED Disposition     ED Disposition   Discharge    Condition   Stable    Comment   --             Rodolfo Su PA  [address]  [place] [id number]  [phone]          Alvarez Eason MD  [address] [address]  [place] [id number] [phone]               Medication List      No changes were made to your prescriptions during this visit.          Huy Grimes PA  04/27/23 1000

## 2023-04-26 NOTE — PROGRESS NOTES
"Chief Complaint  Numbness (Face, hands, arms) and muscle fatigue    Subjective        Cj Lerma III presents to [facility]  History of Present Illness     He had two episodes today. Also happened last night with dizziness. As he was cooling off after training he had to lay down and drink water. Left hand went numb. Driving home his hands were seizing up and felt like it was spreading. He was laying in the grass. He wonders if it is related to blood pressure. He drinks liquid IV, vitamin water and gatorade. Currently he feels awful, tired, muscles feel depleted, legs are shot, hard to walk when he gets up, sore in legs, abs feel tensing and pulsing up.         Objective   Vital Signs:  /72   Pulse 67   Ht 180.3 cm (70.98\")   Wt 75.8 kg (167 lb 3.2 oz)   SpO2 98%   BMI 23.33 kg/m²   Estimated body mass index is 23.33 kg/m² as calculated from the following:    Height as of this encounter: 180.3 cm (70.98\").    Weight as of this encounter: 75.8 kg (167 lb 3.2 oz).       BMI is within normal parameters. No other follow-up for BMI required.      Physical Exam  Vitals reviewed.   Constitutional:       General: He is not in acute distress.     Appearance: He is not ill-appearing, toxic-appearing or diaphoretic.   Cardiovascular:      Rate and Rhythm: Normal rate and regular rhythm.   Pulmonary:      Effort: Pulmonary effort is normal.      Breath sounds: Normal breath sounds.   Abdominal:      General: There is no distension.      Palpations: Abdomen is soft.      Tenderness: There is no abdominal tenderness. There is no guarding or rebound.   Musculoskeletal:      Comments: Upper and lower extremity strength 5 out of 5 bilateral   Neurological:      Mental Status: He is alert.      Gait: Gait normal.   Psychiatric:         Mood and Affect: Mood is anxious.        Result Review :  The following data was reviewed by: Daysi Juan MD on 04/26/2023:  Common labs        " 7/7/2022    12:09 7/28/2022    10:48 4/7/2023    14:34   Common Labs   Glucose  103   90     Glucose 97          BUN 21      16   17     Creatinine 1.41      1.01   1.06     EGFR Non  Am 60          EGFR African Am >60          Sodium 140      141   138     Potassium 4.3      3.8   3.8     Chloride 101      103   102     Calcium 9.6      9.1   10.1     Albumin 5.2       5.2     Total Bilirubin 0.4       0.2     Alkaline Phosphatase 60       61     AST (SGOT) 33       15     ALT (SGPT) 44       15     WBC 11.04       6.54     Hemoglobin 16.3       15.8     Hematocrit 48.4       45.0     Platelets 249       267     Total Cholesterol   178     Triglycerides   63     HDL Cholesterol   54     LDL Cholesterol    112         This result is from an external source.       CK (04/07/2023 14:34)    Office Visit with Rodolfo Su PA (04/07/2023)   rheumatology clinic 7/7/22      Results for orders placed or performed in visit on 04/26/23   POC Urinalysis Dipstick, Automated    Specimen: Urine   Result Value Ref Range    Color Yellow Yellow, Straw, Dark Yellow, Adeline    Clarity, UA Clear Clear    Specific Gravity  1.015 1.005 - 1.030    pH, Urine 6.0 5.0 - 8.0    Leukocytes Small (1+) (A) Negative    Nitrite, UA Negative Negative    Protein, POC Trace (A) Negative mg/dL    Glucose, UA Negative Negative mg/dL    Ketones, UA Negative Negative    Urobilinogen, UA Normal Normal, 0.2 E.U./dL    Bilirubin Negative Negative    Blood, UA Trace (A) Negative    Lot Number [id number]     Expiration Date 03/25/2024           Assessment and Plan   Diagnoses and all orders for this visit:    1. Non-traumatic rhabdomyolysis (Primary)  -     POC Urinalysis Dipstick, Automated  -     CK; Future  -     Comprehensive metabolic panel; Future    In office lab closed, he will need to go to the hospital lab which is open later or go to the ER. His urinalysis has the presence of protein and blood.  Advised patient that we do not have IV  fluids and he would need to seek care at the emergency department.         Follow Up   Return if symptoms worsen or fail to improve.  Patient was given instructions and counseling regarding his condition or for health maintenance advice. Please see specific information pulled into the AVS if appropriate.   Electronically signed by Daysi Juan MD, 04/26/23, 4:51 PM EDT.

## 2024-12-07 ENCOUNTER — APPOINTMENT (OUTPATIENT)
Dept: CT IMAGING | Facility: HOSPITAL | Age: 29
End: 2024-12-07
Payer: COMMERCIAL

## 2024-12-07 ENCOUNTER — HOSPITAL ENCOUNTER (EMERGENCY)
Facility: HOSPITAL | Age: 29
Discharge: HOME | End: 2024-12-07
Attending: EMERGENCY MEDICINE
Payer: COMMERCIAL

## 2024-12-07 VITALS
BODY MASS INDEX: 19.6 KG/M2 | OXYGEN SATURATION: 98 % | SYSTOLIC BLOOD PRESSURE: 110 MMHG | WEIGHT: 140 LBS | TEMPERATURE: 97.4 F | RESPIRATION RATE: 18 BRPM | HEIGHT: 71 IN | DIASTOLIC BLOOD PRESSURE: 58 MMHG | HEART RATE: 97 BPM

## 2024-12-07 DIAGNOSIS — R10.9 ACUTE ABDOMINAL PAIN: ICD-10-CM

## 2024-12-07 DIAGNOSIS — R11.2 NAUSEA AND VOMITING, UNSPECIFIED VOMITING TYPE: ICD-10-CM

## 2024-12-07 DIAGNOSIS — E87.20 LACTIC ACIDOSIS: Primary | ICD-10-CM

## 2024-12-07 DIAGNOSIS — R74.8 ELEVATED CK: ICD-10-CM

## 2024-12-07 DIAGNOSIS — F10.929 ALCOHOLIC INTOXICATION WITH COMPLICATION: ICD-10-CM

## 2024-12-07 DIAGNOSIS — Z87.39 HISTORY OF RHABDOMYOLYSIS: ICD-10-CM

## 2024-12-07 DIAGNOSIS — E86.0 DEHYDRATION: ICD-10-CM

## 2024-12-07 DIAGNOSIS — F12.188 CANNABIS HYPEREMESIS SYNDROME CONCURRENT WITH AND DUE TO CANNABIS ABUSE: ICD-10-CM

## 2024-12-07 DIAGNOSIS — R94.31 EKG ABNORMALITY: ICD-10-CM

## 2024-12-07 LAB
ALBUMIN SERPL-MCNC: 4.9 G/DL (ref 3.5–5.2)
ALBUMIN/GLOB SERPL: 2 G/DL
ALP SERPL-CCNC: 58 U/L (ref 39–117)
ALT SERPL W P-5'-P-CCNC: 27 U/L (ref 1–41)
AMPHET+METHAMPHET UR QL: NEGATIVE
AMPHETAMINES UR QL: NEGATIVE
ANION GAP SERPL CALCULATED.3IONS-SCNC: 14 MMOL/L (ref 5–15)
AST SERPL-CCNC: 37 U/L (ref 1–40)
BARBITURATES UR QL SCN: NEGATIVE
BASOPHILS # BLD AUTO: 0.05 10*3/MM3 (ref 0–0.2)
BASOPHILS NFR BLD AUTO: 0.3 % (ref 0–1.5)
BENZODIAZ UR QL SCN: NEGATIVE
BILIRUB SERPL-MCNC: 0.5 MG/DL (ref 0–1.2)
BILIRUB UR QL STRIP: NEGATIVE
BUN SERPL-MCNC: 21 MG/DL (ref 6–20)
BUN/CREAT SERPL: 23.1 (ref 7–25)
BUPRENORPHINE SERPL-MCNC: NEGATIVE NG/ML
CALCIUM SPEC-SCNC: 9.3 MG/DL (ref 8.6–10.5)
CANNABINOIDS SERPL QL: POSITIVE
CHLORIDE SERPL-SCNC: 102 MMOL/L (ref 98–107)
CK SERPL-CCNC: 537 U/L (ref 20–200)
CLARITY UR: CLEAR
CO2 SERPL-SCNC: 21 MMOL/L (ref 22–29)
COCAINE UR QL: NEGATIVE
COLOR UR: YELLOW
CREAT SERPL-MCNC: 0.91 MG/DL (ref 0.76–1.27)
D-LACTATE SERPL-SCNC: 2.2 MMOL/L (ref 0.5–2)
D-LACTATE SERPL-SCNC: 3.1 MMOL/L (ref 0.5–2)
DEPRECATED RDW RBC AUTO: 36.7 FL (ref 37–54)
EGFRCR SERPLBLD CKD-EPI 2021: 116.3 ML/MIN/1.73
EOSINOPHIL # BLD AUTO: 0.01 10*3/MM3 (ref 0–0.4)
EOSINOPHIL NFR BLD AUTO: 0.1 % (ref 0.3–6.2)
ERYTHROCYTE [DISTWIDTH] IN BLOOD BY AUTOMATED COUNT: 11.6 % (ref 12.3–15.4)
ETHANOL BLD-MCNC: 126 MG/DL (ref 0–10)
FENTANYL UR-MCNC: NEGATIVE NG/ML
GLOBULIN UR ELPH-MCNC: 2.4 GM/DL
GLUCOSE SERPL-MCNC: 112 MG/DL (ref 65–99)
GLUCOSE UR STRIP-MCNC: NEGATIVE MG/DL
HCT VFR BLD AUTO: 41.9 % (ref 37.5–51)
HGB BLD-MCNC: 15 G/DL (ref 13–17.7)
HGB UR QL STRIP.AUTO: NEGATIVE
IMM GRANULOCYTES # BLD AUTO: 0.06 10*3/MM3 (ref 0–0.05)
IMM GRANULOCYTES NFR BLD AUTO: 0.4 % (ref 0–0.5)
KETONES UR QL STRIP: ABNORMAL
LEUKOCYTE ESTERASE UR QL STRIP.AUTO: NEGATIVE
LYMPHOCYTES # BLD AUTO: 2.08 10*3/MM3 (ref 0.7–3.1)
LYMPHOCYTES NFR BLD AUTO: 13 % (ref 19.6–45.3)
MCH RBC QN AUTO: 30.8 PG (ref 26.6–33)
MCHC RBC AUTO-ENTMCNC: 35.8 G/DL (ref 31.5–35.7)
MCV RBC AUTO: 86 FL (ref 79–97)
METHADONE UR QL SCN: NEGATIVE
MONOCYTES # BLD AUTO: 0.93 10*3/MM3 (ref 0.1–0.9)
MONOCYTES NFR BLD AUTO: 5.8 % (ref 5–12)
NEUTROPHILS NFR BLD AUTO: 12.84 10*3/MM3 (ref 1.7–7)
NEUTROPHILS NFR BLD AUTO: 80.4 % (ref 42.7–76)
NITRITE UR QL STRIP: NEGATIVE
NRBC BLD AUTO-RTO: 0 /100 WBC (ref 0–0.2)
OPIATES UR QL: NEGATIVE
OXYCODONE UR QL SCN: NEGATIVE
PCP UR QL SCN: NEGATIVE
PH UR STRIP.AUTO: 6.5 [PH] (ref 5–8)
PLATELET # BLD AUTO: 236 10*3/MM3 (ref 140–450)
PMV BLD AUTO: 11 FL (ref 6–12)
POTASSIUM SERPL-SCNC: 3.8 MMOL/L (ref 3.5–5.2)
PROT SERPL-MCNC: 7.3 G/DL (ref 6–8.5)
PROT UR QL STRIP: NEGATIVE
RBC # BLD AUTO: 4.87 10*6/MM3 (ref 4.14–5.8)
SODIUM SERPL-SCNC: 137 MMOL/L (ref 136–145)
SP GR UR STRIP: 1.03 (ref 1–1.03)
TRICYCLICS UR QL SCN: NEGATIVE
TROPONIN T SERPL HS-MCNC: <6 NG/L
UROBILINOGEN UR QL STRIP: ABNORMAL
WBC NRBC COR # BLD AUTO: 15.97 10*3/MM3 (ref 3.4–10.8)

## 2024-12-07 PROCEDURE — 99285 EMERGENCY DEPT VISIT HI MDM: CPT

## 2024-12-07 PROCEDURE — 81003 URINALYSIS AUTO W/O SCOPE: CPT | Performed by: NURSE PRACTITIONER

## 2024-12-07 PROCEDURE — 96374 THER/PROPH/DIAG INJ IV PUSH: CPT

## 2024-12-07 PROCEDURE — 82550 ASSAY OF CK (CPK): CPT | Performed by: NURSE PRACTITIONER

## 2024-12-07 PROCEDURE — 36415 COLL VENOUS BLD VENIPUNCTURE: CPT

## 2024-12-07 PROCEDURE — 74177 CT ABD & PELVIS W/CONTRAST: CPT

## 2024-12-07 PROCEDURE — 80053 COMPREHEN METABOLIC PANEL: CPT | Performed by: NURSE PRACTITIONER

## 2024-12-07 PROCEDURE — 25510000001 IOPAMIDOL 61 % SOLUTION: Performed by: EMERGENCY MEDICINE

## 2024-12-07 PROCEDURE — 93005 ELECTROCARDIOGRAM TRACING: CPT | Performed by: NURSE PRACTITIONER

## 2024-12-07 PROCEDURE — 93005 ELECTROCARDIOGRAM TRACING: CPT | Performed by: EMERGENCY MEDICINE

## 2024-12-07 PROCEDURE — 25010000002 PROCHLORPERAZINE 10 MG/2ML SOLUTION: Performed by: NURSE PRACTITIONER

## 2024-12-07 PROCEDURE — 84484 ASSAY OF TROPONIN QUANT: CPT | Performed by: NURSE PRACTITIONER

## 2024-12-07 PROCEDURE — 80307 DRUG TEST PRSMV CHEM ANLYZR: CPT | Performed by: NURSE PRACTITIONER

## 2024-12-07 PROCEDURE — 83605 ASSAY OF LACTIC ACID: CPT | Performed by: NURSE PRACTITIONER

## 2024-12-07 PROCEDURE — 85025 COMPLETE CBC W/AUTO DIFF WBC: CPT | Performed by: NURSE PRACTITIONER

## 2024-12-07 PROCEDURE — 82077 ASSAY SPEC XCP UR&BREATH IA: CPT | Performed by: NURSE PRACTITIONER

## 2024-12-07 PROCEDURE — 25810000003 SODIUM CHLORIDE 0.9 % SOLUTION: Performed by: NURSE PRACTITIONER

## 2024-12-07 RX ORDER — PROCHLORPERAZINE EDISYLATE 5 MG/ML
10 INJECTION INTRAMUSCULAR; INTRAVENOUS ONCE
Status: COMPLETED | OUTPATIENT
Start: 2024-12-07 | End: 2024-12-07

## 2024-12-07 RX ORDER — IOPAMIDOL 612 MG/ML
100 INJECTION, SOLUTION INTRAVASCULAR
Status: COMPLETED | OUTPATIENT
Start: 2024-12-07 | End: 2024-12-07

## 2024-12-07 RX ORDER — SODIUM CHLORIDE 0.9 % (FLUSH) 0.9 %
10 SYRINGE (ML) INJECTION AS NEEDED
Status: DISCONTINUED | OUTPATIENT
Start: 2024-12-07 | End: 2024-12-07 | Stop reason: HOSPADM

## 2024-12-07 RX ORDER — ONDANSETRON 4 MG/1
4 TABLET, ORALLY DISINTEGRATING ORAL EVERY 6 HOURS PRN
Qty: 12 TABLET | Refills: 0 | Status: SHIPPED | OUTPATIENT
Start: 2024-12-07

## 2024-12-07 RX ADMIN — PROCHLORPERAZINE EDISYLATE 10 MG: 5 INJECTION INTRAMUSCULAR; INTRAVENOUS at 09:07

## 2024-12-07 RX ADMIN — IOPAMIDOL 90 ML: 612 INJECTION, SOLUTION INTRAVENOUS at 11:03

## 2024-12-07 RX ADMIN — SODIUM CHLORIDE 1000 ML: 9 INJECTION, SOLUTION INTRAVENOUS at 08:46

## 2024-12-07 RX ADMIN — SODIUM CHLORIDE 1000 ML: 9 INJECTION, SOLUTION INTRAVENOUS at 09:20

## 2024-12-07 NOTE — ED PROVIDER NOTES
Subjective   History of Present Illness  Patient presents the ER for nausea vomiting body aches muscle cramps.  He has been drinking alcohol with last drink being 2:30 AM.  He also smokes marijuana.  He has a history of rhabdo.  Denies any fevers or chills.  He has some nausea vomiting but denies abdominal pain.  He is accompanied by his father.        Review of Systems    Past Medical History:   Diagnosis Date    Allergic Seasonal my whole life    Exercise-induced asthma     Headache 3/15/23    Immediate migraine 5 minutes into exercise       No Known Allergies    Past Surgical History:   Procedure Laterality Date    KNEE ACL RECONSTRUCTION Left     TONSILLECTOMY      WISDOM TOOTH EXTRACTION         Family History   Problem Relation Age of Onset    Thyroid disease Mother     Hypertension Father     Breast cancer Paternal Aunt     Lung cancer Maternal Grandfather     Lung cancer Paternal Grandfather     Hypertension Paternal Grandfather        Social History     Socioeconomic History    Marital status: Single   Tobacco Use    Smoking status: Some Days     Types: Electronic Cigarette    Smokeless tobacco: Never    Tobacco comments:     socially   Vaping Use    Vaping status: Every Day    Substances: Nicotine, THC, CBD, Flavoring    Devices: Disposable   Substance and Sexual Activity    Alcohol use: Yes     Comment: Very seldom    Drug use: Yes     Types: Marijuana     Comment: occasionally    Sexual activity: Yes     Partners: Female     Birth control/protection: I.U.D.           Objective   Physical Exam  Constitutional:       Appearance: He is well-developed. He is ill-appearing.   HENT:      Head: Normocephalic and atraumatic.      Right Ear: External ear normal.      Left Ear: External ear normal.      Nose: Nose normal.   Eyes:      Conjunctiva/sclera: Conjunctivae normal.      Pupils: Pupils are equal, round, and reactive to light.   Cardiovascular:      Rate and Rhythm: Normal rate and regular rhythm.       Heart sounds: Normal heart sounds.   Pulmonary:      Effort: Pulmonary effort is normal.      Breath sounds: Normal breath sounds.   Abdominal:      General: Bowel sounds are normal.      Palpations: Abdomen is soft.   Musculoskeletal:         General: Normal range of motion.      Cervical back: Normal range of motion and neck supple.   Skin:     General: Skin is warm and dry.   Neurological:      Mental Status: He is alert and oriented to person, place, and time.   Psychiatric:         Behavior: Behavior normal.         Judgment: Judgment normal.         Procedures           ED Course  ED Course as of 12/07/24 1350   Sat Dec 07, 2024   0911 Differential including rhabdo.  Alcohol intoxication.  Hyperemesis related to cannabis a combination of all these things is most likely.  He does have an elevated white blood cell count.  We may need to do a CAT scan.  Will need to give IV fluids and nausea medications.  Possible admission. [JM]   9865 Patient refuses admission and wants to go home.  Tells me he would rest much better at home and does not want to come in the hospital.  He is actually ready to go home now he is very eager to leave.  He tells me he feels substantially better with resolution of symptoms except for some mild leg cramping that he tells me is from the bed.  I did discuss with him cannabis syndrome as well as alcohol use his elevated CK.  Tells me he is able to get repeat lab work this week for further evaluation.  He is aware that his CK level could be going up.  If he has continued muscle aches cramping and weakness with worsening symptoms he must return to the ER for further evaluation.  If he continues to feel better that is a reassuring sign but still needs to get lab work checked this week for further evaluation or worsening symptoms to return to the ER he was thankful for care given at [facility].  He is very eager to be discharged.  Him and his father are aware of all findings as well as  appropriate follow-up including with the EKG abnormality and follow-up with cardiology as well as primary care. [JM]      ED Course User Index  [JM] Huy Vallejo APRN                                                  CT Abdomen Pelvis With Contrast   Final Result   Impression:      No acute findings in the abdomen or pelvis.         Electronically Signed: Joshua Joshi MD     12/7/2024 11:27 AM EST     Workstation ID: [id number]               Medical Decision Making  Problems Addressed:  Acute abdominal pain: complicated acute illness or injury  Alcoholic intoxication with complication: complicated acute illness or injury  Cannabis hyperemesis syndrome concurrent with and due to cannabis abuse: complicated acute illness or injury  Dehydration: complicated acute illness or injury  EKG abnormality: complicated acute illness or injury  Elevated CK: complicated acute illness or injury  History of rhabdomyolysis: complicated acute illness or injury  Lactic acidosis: complicated acute illness or injury  Nausea and vomiting, unspecified vomiting type: complicated acute illness or injury    Amount and/or Complexity of Data Reviewed  External Data Reviewed: labs, radiology and ECG.     Details: EKG interpreted myself with incomplete bundle branch block with no acute process.  Negative troponin.  Labs: ordered.  Radiology: ordered.  ECG/medicine tests: ordered.    Risk  Prescription drug management.        Final diagnoses:   Lactic acidosis   Nausea and vomiting, unspecified vomiting type   Alcoholic intoxication with complication   Dehydration   Acute abdominal pain   Cannabis hyperemesis syndrome concurrent with and due to cannabis abuse   Elevated CK   History of rhabdomyolysis   EKG abnormality       ED Disposition  ED Disposition       ED Disposition   Discharge    Condition   Stable    Comment   --               [facility] CARDIOLOGY  [address]  [address]  [facility]  [address]  [phone]  Schedule an appointment as soon as possible for a visit       Rodolfo Su PA-C  [address] [place] [id number] [phone]    Schedule an appointment as soon as possible for a visit   This week for repeat testing of your kidney functions and CK level         Medication List        New Prescriptions      ondansetron ODT 4 MG disintegrating tablet  Commonly known as: ZOFRAN-ODT  Take 1 tablet by mouth Every 6 (Six) Hours As Needed for Nausea or Vomiting.               Where to Get Your Medications        These medications were sent to [facility] [id number] - [place], [place] - [address] - [phone]  - [phone]   [address], [facility] [address]      Phone: [phone]   ondansetron ODT 4 MG disintegrating tablet            Huy Vallejo, SHIRLEY  12/07/24 5169

## 2024-12-08 LAB
QT INTERVAL: 438 MS
QT INTERVAL: 456 MS
QTC INTERVAL: 438 MS
QTC INTERVAL: 456 MS

## 2024-12-11 ENCOUNTER — LAB (OUTPATIENT)
Dept: LAB | Facility: HOSPITAL | Age: 29
End: 2024-12-11
Payer: COMMERCIAL

## 2024-12-11 ENCOUNTER — OFFICE VISIT (OUTPATIENT)
Dept: FAMILY MEDICINE CLINIC | Facility: CLINIC | Age: 29
End: 2024-12-11
Payer: COMMERCIAL

## 2024-12-11 VITALS
BODY MASS INDEX: 21.11 KG/M2 | WEIGHT: 150.8 LBS | SYSTOLIC BLOOD PRESSURE: 112 MMHG | HEIGHT: 71 IN | HEART RATE: 63 BPM | DIASTOLIC BLOOD PRESSURE: 66 MMHG | OXYGEN SATURATION: 98 %

## 2024-12-11 DIAGNOSIS — J45.21 MILD INTERMITTENT ASTHMA WITH EXACERBATION: ICD-10-CM

## 2024-12-11 DIAGNOSIS — R74.8 ELEVATED CK: ICD-10-CM

## 2024-12-11 DIAGNOSIS — M62.82 NON-TRAUMATIC RHABDOMYOLYSIS: ICD-10-CM

## 2024-12-11 DIAGNOSIS — J02.9 SORE THROAT: ICD-10-CM

## 2024-12-11 DIAGNOSIS — J06.9 VIRAL UPPER RESPIRATORY TRACT INFECTION: Primary | ICD-10-CM

## 2024-12-11 LAB
ANION GAP SERPL CALCULATED.3IONS-SCNC: 11.5 MMOL/L (ref 5–15)
BILIRUB UR QL STRIP: NEGATIVE
BUN SERPL-MCNC: 21 MG/DL (ref 6–20)
BUN/CREAT SERPL: 20.2 (ref 7–25)
CALCIUM SPEC-SCNC: 9.9 MG/DL (ref 8.6–10.5)
CHLORIDE SERPL-SCNC: 100 MMOL/L (ref 98–107)
CK SERPL-CCNC: 2319 U/L (ref 20–200)
CLARITY UR: CLEAR
CO2 SERPL-SCNC: 26.5 MMOL/L (ref 22–29)
COLOR UR: YELLOW
CREAT SERPL-MCNC: 1.04 MG/DL (ref 0.76–1.27)
EGFRCR SERPLBLD CKD-EPI 2021: 99.1 ML/MIN/1.73
EXPIRATION DATE: NORMAL
EXPIRATION DATE: NORMAL
FLUAV AG UPPER RESP QL IA.RAPID: NOT DETECTED
FLUBV AG UPPER RESP QL IA.RAPID: NOT DETECTED
GLUCOSE SERPL-MCNC: 95 MG/DL (ref 65–99)
GLUCOSE UR STRIP-MCNC: NEGATIVE MG/DL
HGB UR QL STRIP.AUTO: NEGATIVE
HOLD SPECIMEN: NORMAL
INTERNAL CONTROL: NORMAL
INTERNAL CONTROL: NORMAL
KETONES UR QL STRIP: NEGATIVE
LEUKOCYTE ESTERASE UR QL STRIP.AUTO: NEGATIVE
Lab: NORMAL
Lab: NORMAL
NITRITE UR QL STRIP: NEGATIVE
PH UR STRIP.AUTO: 6 [PH] (ref 5–8)
POTASSIUM SERPL-SCNC: 4.9 MMOL/L (ref 3.5–5.2)
PROT UR QL STRIP: NEGATIVE
S PYO AG THROAT QL: NEGATIVE
SARS-COV-2 AG UPPER RESP QL IA.RAPID: NOT DETECTED
SODIUM SERPL-SCNC: 138 MMOL/L (ref 136–145)
SP GR UR STRIP: 1.02 (ref 1–1.03)
UROBILINOGEN UR QL STRIP: NORMAL

## 2024-12-11 PROCEDURE — 82550 ASSAY OF CK (CPK): CPT

## 2024-12-11 PROCEDURE — 80048 BASIC METABOLIC PNL TOTAL CA: CPT

## 2024-12-11 PROCEDURE — 87880 STREP A ASSAY W/OPTIC: CPT | Performed by: PHYSICIAN ASSISTANT

## 2024-12-11 PROCEDURE — 99214 OFFICE O/P EST MOD 30 MIN: CPT | Performed by: PHYSICIAN ASSISTANT

## 2024-12-11 PROCEDURE — 87428 SARSCOV & INF VIR A&B AG IA: CPT | Performed by: PHYSICIAN ASSISTANT

## 2024-12-11 PROCEDURE — 1125F AMNT PAIN NOTED PAIN PRSNT: CPT | Performed by: PHYSICIAN ASSISTANT

## 2024-12-11 PROCEDURE — 81003 URINALYSIS AUTO W/O SCOPE: CPT

## 2024-12-11 RX ORDER — PREDNISONE 20 MG/1
20 TABLET ORAL DAILY
Qty: 5 TABLET | Refills: 0 | Status: SHIPPED | OUTPATIENT
Start: 2024-12-11 | End: 2024-12-16

## 2024-12-11 RX ORDER — FEXOFENADINE HCL 60 MG/1
60 TABLET, FILM COATED ORAL DAILY
COMMUNITY

## 2024-12-11 RX ORDER — GUAIFENESIN/DEXTROMETHORPHAN 100-10MG/5
5 SYRUP ORAL 3 TIMES DAILY PRN
Qty: 118 ML | Refills: 0 | Status: SHIPPED | OUTPATIENT
Start: 2024-12-11

## 2024-12-11 RX ORDER — ALBUTEROL SULFATE 90 UG/1
2 INHALANT RESPIRATORY (INHALATION) EVERY 4 HOURS PRN
Qty: 8 G | Refills: 0 | Status: SHIPPED | OUTPATIENT
Start: 2024-12-11

## 2024-12-11 NOTE — PROGRESS NOTES
"Chief Complaint   Patient presents with    Hospital Follow Up Visit     Follow up for ER visit. Need to check labs again, possible stress test,  Feeling weak but better since er visit. Could also check my knee out, I've been playing a decent amount of soccer.  Chest/ throat congestion (started Saturday) blood in mucus, sore throat, SOB, chest pain, fatigue, aches          HPI     Cj Lerma III is a 29 y.o. male with a history of asthma and nontraumatic rhabdomyolysis who is here for ER follow-up.     He was seen at [facility] ER on 12/7 for nausea, vomiting, myalgias, and body aches. He was diagnosed with alcohol intoxication, lactic acidosis, dehydration, cannabis hyperemesis, and abnormal EKG. He reports he still has some fatigue and weakness but is feeling \"a lot better\" since his ER visit. He reports excessive alcohol consumption (at least 4 mixed drinks and 6 shots) that day. He denies further nausea, vomiting, or alcohol intake.     The patient has not been seen at this office since April of 2023. He reports he moved back to this area in June or July. Today he is primary concerned about sore throat, postnasal drip, cough productive of clear sputum mainly but every now and then bloody and brown, body aches for 2-3 days. He also notes some shallow breathing and chest tightness with episodic sharp pains. He denies wheezing, fever, chills. He does smoke marijuana and vapes. He does not have albuterol because he has not needed it recently. He is not aware of recent sick contacts.     He states he had not had any episodes of rhabdo in a while and attributes his symptoms this time to being \"black out drunk.\" He was evaluated by rheumatology and neurology at the [facility]. He has been lost to follow-up for his episodic rhabdomyolysis.    Past Medical History:   Diagnosis Date    Allergic Seasonal my whole life    Exercise-induced asthma     Headache 3/15/23    Immediate migraine 5 minutes into exercise "       Past Surgical History:   Procedure Laterality Date    KNEE ACL RECONSTRUCTION Left     TONSILLECTOMY      WISDOM TOOTH EXTRACTION         Family History   Problem Relation Age of Onset    Thyroid disease Mother     Hypertension Father     Breast cancer Paternal Aunt     Lung cancer Maternal Grandfather     Lung cancer Paternal Grandfather     Hypertension Paternal Grandfather        Social History     Socioeconomic History    Marital status: Single   Tobacco Use    Smoking status: Some Days     Types: Electronic Cigarette    Smokeless tobacco: Never    Tobacco comments:     socially   Vaping Use    Vaping status: Every Day    Substances: Nicotine, THC, CBD, Flavoring    Devices: Disposable   Substance and Sexual Activity    Alcohol use: Yes     Comment: Very seldom/low tolerance    Drug use: Yes     Types: Marijuana     Comment: I smoke weed 1-4 times a week.    Sexual activity: Yes     Partners: Female     Birth control/protection: I.U.D.       No Known Allergies    ROS    Review of Systems   Constitutional:  Negative for chills and fever.   HENT:  Positive for congestion, postnasal drip and sore throat.    Respiratory:  Positive for cough and shortness of breath.    Cardiovascular:  Positive for chest pain.   Musculoskeletal:  Positive for myalgias.   Neurological:  Positive for weakness. Negative for headache.       Vitals:    12/11/24 1122   BP: 112/66   Pulse: 63   SpO2: 98%     Body mass index is 21.04 kg/m².      Current Outpatient Medications:     albuterol sulfate  (90 Base) MCG/ACT inhaler, Inhale 2 puffs Every 4 (Four) Hours As Needed for Wheezing or Shortness of Air., Disp: 8 g, Rfl: 0    fexofenadine (ALLEGRA) 60 MG tablet, Take 1 tablet by mouth Daily. OTC, Disp: , Rfl:     Cetirizine HCl (ZYRTEC ALLERGY PO), Take  by mouth. (Patient not taking: Reported on 12/11/2024), Disp: , Rfl:     guaiFENesin-dextromethorphan (ROBITUSSIN DM) 100-10 MG/5ML syrup, Take 5 mL by mouth 3 (Three) Times a  Day As Needed for Cough or Congestion., Disp: 118 mL, Rfl: 0    ondansetron ODT (ZOFRAN-ODT) 4 MG disintegrating tablet, Take 1 tablet by mouth Every 6 (Six) Hours As Needed for Nausea or Vomiting. (Patient not taking: Reported on 12/11/2024), Disp: 12 tablet, Rfl: 0    predniSONE (DELTASONE) 20 MG tablet, Take 1 tablet by mouth Daily for 5 days., Disp: 5 tablet, Rfl: 0    PE    Physical Exam  Vitals reviewed.   Constitutional:       General: He is not in acute distress.     Appearance: He is well-developed.   HENT:      Head: Normocephalic.      Right Ear: Tympanic membrane and ear canal normal. Tympanic membrane is not erythematous.      Left Ear: Tympanic membrane and ear canal normal. Tympanic membrane is not erythematous.      Nose:      Right Sinus: No maxillary sinus tenderness or frontal sinus tenderness.      Left Sinus: No maxillary sinus tenderness or frontal sinus tenderness.      Mouth/Throat:      Mouth: Mucous membranes are moist.      Pharynx: Uvula midline.      Tonsils: No tonsillar exudate.   Eyes:      General:         Right eye: No discharge.         Left eye: No discharge.      Conjunctiva/sclera: Conjunctivae normal.   Cardiovascular:      Rate and Rhythm: Normal rate and regular rhythm.      Heart sounds: Normal heart sounds.   Pulmonary:      Effort: Pulmonary effort is normal. No respiratory distress.      Breath sounds: Normal breath sounds. No wheezing, rhonchi or rales.   Musculoskeletal:      Cervical back: Normal range of motion and neck supple.   Lymphadenopathy:      Cervical: No cervical adenopathy.      Upper Body:      Right upper body: No supraclavicular adenopathy.      Left upper body: No supraclavicular adenopathy.   Skin:     General: Skin is warm and dry.   Neurological:      Motor: No weakness.      Comments: BLE strength 5/5.    Psychiatric:         Behavior: Behavior normal.         Results    Results for orders placed or performed in visit on 12/11/24   POCT SARS-CoV-2 +  Flu Antigen MARILYNN    Collection Time: 12/11/24 11:48 AM    Specimen: Swab   Result Value Ref Range    SARS Antigen Not Detected Not Detected, Presumptive Negative    Influenza A Antigen MARILYNN Not Detected Not Detected    Influenza B Antigen MARILYNN Not Detected Not Detected    Internal Control Passed Passed    Lot Number [id number]     Expiration Date 10/23/2025    POC Rapid Strep A    Collection Time: 12/11/24 11:49 AM    Specimen: Swab   Result Value Ref Range    Rapid Strep A Screen Negative Negative, VALID, INVALID, Not Performed    Internal Control Passed Passed    Lot Number [id number]     Expiration Date 01/08/2026        A/P    Problem List Items Addressed This Visit          Musculoskeletal and Injuries    Non-traumatic rhabdomyolysis    Relevant Orders    Ambulatory Referral to Neurology     Other Visit Diagnoses       Viral upper respiratory tract infection    -  Primary    Mild intermittent asthma with exacerbation        Relevant Medications    albuterol sulfate  (90 Base) MCG/ACT inhaler    Sore throat        Relevant Orders    POCT SARS-CoV-2 + Flu Antigen MARILYNN (Completed)    POC Rapid Strep A (Completed)    Elevated CK        Relevant Orders    Basic Metabolic Panel    Urinalysis With Culture If Indicated - Urine, Clean Catch    CK          -CK was 537 at the ER. Repeat bmp, CK, UA after recent episode of alcohol intoxication, dehydration. I encouraged him to avoid THC and alcohol and schedule follow-up with neurology at the [facility] for routine follow-up of his nontraumatic rhabdo. Referral was ordered today.   -EKG in the ER showed sinus arrhythmia, incomplete RBBB. Follow-up with cardiology as scheduled next month.  -Influenza/COVID, strep tests are negative today. Discussed likely viral etiology of his and recommended initial symptomatic treatment with rest, hydration, mucinex-DM syrup for cough prn, albuterol prn wheezing/chest tightness. Start prednisone burst for mild asthma  exacerbation. The patient was given written instructions recommending over-the-counter products and home remedies and counseled to return if symptoms worsen or persist.   -RTC for annual physical, sooner prn.       Plan of care was reviewed with patient at the conclusion of today's visit. Education was provided regarding diagnoses, management, and the importance of keeping follow-up appointments. The patient was counseled regarding the risks, benefits, and possible side-effects of treatment. Patient and/or family express understanding and agreement with the management plan.        Rodolfo Su PA-C

## 2024-12-11 NOTE — PATIENT INSTRUCTIONS
"Attain adequate rest and increase clear fluid intake.   Start vitamin C 500 mg daily.   Use Zinc lozenges or chewable tablets (generic for Zicam) in the early course of your illness.   Practice regular hand hygiene and cover your cough to help prevent spread of infection  Use warm salt water gargles, Cepacol lozenges, and hot tea with honey as needed for throat comfort.   Use prescribed cough syrup and \"ocean\" or \"simply saline\" brand sterile nasal spray twice daily.   Use warm compresses over sinuses for comfort as needed  Alternate taking Tylenol 500 mg and Ibuprofen 400 mg every 4 hours as needed for headache, body aches, throat pain, and/or fever reduction  Please return if symptoms worsen or persist for more than 7-10 days.    "

## 2024-12-12 ENCOUNTER — TELEPHONE (OUTPATIENT)
Dept: FAMILY MEDICINE CLINIC | Facility: CLINIC | Age: 29
End: 2024-12-12
Payer: COMMERCIAL

## 2024-12-12 DIAGNOSIS — R74.8 ELEVATED CK: Primary | ICD-10-CM

## 2024-12-12 NOTE — TELEPHONE ENCOUNTER
Patient called back about lab results.  He requested to only speak to Rodolfo.  Says that he will try to reach back out again tomorrow 12/13/2024

## 2024-12-13 ENCOUNTER — LAB (OUTPATIENT)
Dept: LAB | Facility: HOSPITAL | Age: 29
End: 2024-12-13
Payer: COMMERCIAL

## 2024-12-13 DIAGNOSIS — R74.8 ELEVATED CK: ICD-10-CM

## 2024-12-13 LAB
BILIRUB UR QL STRIP: NEGATIVE
CLARITY UR: CLEAR
COLOR UR: YELLOW
GLUCOSE UR STRIP-MCNC: NEGATIVE MG/DL
HGB UR QL STRIP.AUTO: NEGATIVE
HOLD SPECIMEN: NORMAL
KETONES UR QL STRIP: NEGATIVE
LEUKOCYTE ESTERASE UR QL STRIP.AUTO: NEGATIVE
NITRITE UR QL STRIP: NEGATIVE
PH UR STRIP.AUTO: 6.5 [PH] (ref 5–8)
PROT UR QL STRIP: ABNORMAL
SP GR UR STRIP: 1.03 (ref 1–1.03)
UROBILINOGEN UR QL STRIP: ABNORMAL

## 2024-12-13 PROCEDURE — 80048 BASIC METABOLIC PNL TOTAL CA: CPT

## 2024-12-13 PROCEDURE — 81003 URINALYSIS AUTO W/O SCOPE: CPT

## 2024-12-13 PROCEDURE — 82550 ASSAY OF CK (CPK): CPT

## 2024-12-13 NOTE — TELEPHONE ENCOUNTER
I reviewed the patient's lab results with him over the phone. He has fatigue but reports he is feeling better. No myalgia or worsening muscle weakness. He is drinking plenty of fluids. He is going to stop by our office this morning to repeat labs: BMP, CK, UA to make sure his CK is coming down. Answered all questions. The patient thanked me for the call.

## 2024-12-14 LAB
ANION GAP SERPL CALCULATED.3IONS-SCNC: 10 MMOL/L (ref 5–15)
BUN SERPL-MCNC: 18 MG/DL (ref 6–20)
BUN/CREAT SERPL: 19.6 (ref 7–25)
CALCIUM SPEC-SCNC: 9.8 MG/DL (ref 8.6–10.5)
CHLORIDE SERPL-SCNC: 103 MMOL/L (ref 98–107)
CK SERPL-CCNC: 720 U/L (ref 20–200)
CO2 SERPL-SCNC: 25 MMOL/L (ref 22–29)
CREAT SERPL-MCNC: 0.92 MG/DL (ref 0.76–1.27)
EGFRCR SERPLBLD CKD-EPI 2021: 114.8 ML/MIN/1.73
GLUCOSE SERPL-MCNC: 105 MG/DL (ref 65–99)
POTASSIUM SERPL-SCNC: 5 MMOL/L (ref 3.5–5.2)
SODIUM SERPL-SCNC: 138 MMOL/L (ref 136–145)

## 2025-01-07 ENCOUNTER — HOSPITAL ENCOUNTER (OUTPATIENT)
Dept: CARDIOLOGY | Facility: HOSPITAL | Age: 30
Discharge: HOME | End: 2025-01-07
Payer: COMMERCIAL

## 2025-01-07 ENCOUNTER — OFFICE VISIT (OUTPATIENT)
Dept: CARDIOLOGY | Facility: HOSPITAL | Age: 30
End: 2025-01-07
Payer: COMMERCIAL

## 2025-01-07 VITALS
OXYGEN SATURATION: 98 % | HEART RATE: 62 BPM | SYSTOLIC BLOOD PRESSURE: 113 MMHG | BODY MASS INDEX: 21.72 KG/M2 | HEIGHT: 71 IN | DIASTOLIC BLOOD PRESSURE: 60 MMHG | WEIGHT: 155.13 LBS | RESPIRATION RATE: 15 BRPM

## 2025-01-07 DIAGNOSIS — R00.2 PALPITATIONS: ICD-10-CM

## 2025-01-07 DIAGNOSIS — M62.82 NON-TRAUMATIC RHABDOMYOLYSIS: ICD-10-CM

## 2025-01-07 DIAGNOSIS — R00.2 PALPITATIONS: Primary | ICD-10-CM

## 2025-01-07 PROCEDURE — 93246 EXT ECG>7D<15D RECORDING: CPT

## 2025-01-07 NOTE — PROGRESS NOTES
"Chief Complaint  Abnormal ECG and Establish Care    Subjective      History of Present Illness {CC  Problem List  Visit  Diagnosis   Encounters  Notes  Medications  Labs  Result Review Imaging  Media :23}     Cj Lerma III, 29 y.o. male with recent rhabdomyolysis presents to [facility] for Abnormal ECG and Establish Care.    Patient recently seen at [facility] ER on 12/7 for nausea, vomiting, myalgias, and body aches. He was diagnosed with alcohol intoxication, lactic acidosis, dehydration, cannabis hyperemesis, and abnormal EKG. emergency department workup with normal troponin, ECG with sinus arrhythmia/incomplete RBBB.    Presents today feeling well overall from a cardiac standpoint. Reports prior to recent ED he had worked, played soccer, and drinking resulted in alcohol intoxication. Reports essentially back to normal for the past few days. He had passing out during this alcohol intoxication and was brought to the emergency department. Subsequent URI also. Overall he is without cardiac symptoms besides some chest tightness during his URI. He has been active exercising lately with no cardiac symptoms. Isolated elevated heart rates occasionally but no sustained tachypalpitation. Stays hydrated with water and electrolyte supplements.       Objective     Vital Signs:   Vitals:    01/07/25 1135 01/07/25 1137 01/07/25 1215   BP: 116/71 113/60    BP Location: Left arm Left arm    Patient Position: Standing Sitting    Cuff Size: Adult Adult    Pulse: 61 (!) 48 62   Resp:  15    SpO2: 98% 98%    Weight:  70.4 kg (155 lb 2 oz)    Height:  180.3 cm (70.98\")      Body mass index is 21.65 kg/m².  Physical Exam  Vitals and nursing note reviewed.   Constitutional:       Appearance: Normal appearance.   HENT:      Head: Normocephalic.   Eyes:      Extraocular Movements: Extraocular movements intact.   Neck:      Vascular: No carotid bruit.   Cardiovascular:      Rate and Rhythm: Normal " rate and regular rhythm.      Pulses: Normal pulses.      Heart sounds: Normal heart sounds, S1 normal and S2 normal. No murmur heard.  Pulmonary:      Effort: Pulmonary effort is normal. No respiratory distress.      Breath sounds: Normal breath sounds.   Musculoskeletal:      Cervical back: Neck supple.      Right lower leg: No edema.      Left lower leg: No edema.   Skin:     General: Skin is warm and dry.   Neurological:      General: No focal deficit present.      Mental Status: He is alert.   Psychiatric:         Mood and Affect: Mood normal.         Behavior: Behavior normal.         Thought Content: Thought content normal.        Data Reviewed:{ Labs  Result Review  Imaging  Med Tab  Media :23}     Basic Metabolic Panel (12/13/2024 12:06)   Single High Sensitivity Troponin T (12/07/2024 08:29)  CBC & Differential (12/07/2024 08:29)  ECG 12 Lead Rhythm Change (12/07/2024 09:34)  ECG 12 Lead Chest Pain (12/07/2024 08:27)      Assessment & Plan   Assessment and Plan {CC Problem List  Visit Diagnosis  ROS  Review (Popup)  Health Maintenance  Quality  BestPractice  Medications  SmartSets  SnapShot Encounters  Media :23}     1. Palpitations  -Recent ED evaluation with ECG revealing sinus arrhythmia  -Notes isolated elevated heart rate.  Denies sustained tachypalpitation, exertional cardiac symptoms.  -Given his occasional elevated heart rate will complete Holter monitor for arrhythmia surveillance  - Holter Monitor - 14 Days; Future  -Follow-up in 1 month for symptom check, monitor results    2. Non-traumatic rhabdomyolysis  -Recent ED evaluation for rhabdomyolysis in setting of strenuous exercise and alcohol use.  -CK improved at time of PCP follow-up  -Patient requesting referral to [facility] where he previously followed  - Ambulatory Referral to Neurology  -Continue follow-up with PCP as instructed      Follow Up {Instructions Charge Capture  Follow-up Communications :23}     Return in  about 1 month (around 2/7/2025) for Video visit, Monitor results.      Patient was given instructions and counseling regarding his condition or for health maintenance advice. Please see specific information pulled into the AVS if appropriate. Patient was instructed to call the [facility] with any questions, concerns, or worsening symptoms.    Dictated Utilizing Dragon Dictation   Please note that portions of this note were completed with a voice recognition program. Part of this note may be an electronic transcription/translation of spoken language to printed text using the Dragon Dictation System.

## 2025-02-07 ENCOUNTER — TELEMEDICINE (OUTPATIENT)
Dept: CARDIOLOGY | Facility: HOSPITAL | Age: 30
End: 2025-02-07
Payer: COMMERCIAL

## 2025-02-07 DIAGNOSIS — R00.2 PALPITATIONS: Primary | ICD-10-CM

## 2025-02-07 DIAGNOSIS — M62.82 NON-TRAUMATIC RHABDOMYOLYSIS: ICD-10-CM

## 2025-02-07 NOTE — PROGRESS NOTES
Mode of visit: Video  Location of [facility]   Location of provider: [facility]  You have chosen to receive care through a telehealth visit.   Do you consent to the use video/audio connection for your medical care today?: Yes  This visit included audio and video interaction. No technical issues occurred during the visit.       Chief Complaint  Palpitations (Follow-up)    [facility]  [facility]  Telemedicine note    This was a video enabled telemedicine encounter.    Subjective    History of Present Illness {CC  Problem List  Visit  Diagnosis   Encounters  Notes  Medications  Labs  Result Review Imaging  Media :23}     Cj Lerma III, 29 y.o. male presents to [facility] telemedicine visit for Palpitations (Follow-up)    Since previous evaluation patient completed Holter monitor With no evidence of arrhythmia.  Average heart rate 65, low burden PAC/PVC less than 1%.    Presents today feeling overall well from a cardiovascular standpoint. No significant palpitation symptoms. He has returned to light exercise disc golfing. Stays well hydrated with water and electrolyte supplements. No sustained tachypalpitation, near syncope/syncope.       Previous evaluation:  Patient recently seen at [facility] ER on 12/7 for nausea, vomiting, myalgias, and body aches. He was diagnosed with alcohol intoxication, lactic acidosis, dehydration, cannabis hyperemesis, and abnormal EKG. emergency department workup with normal troponin, ECG with sinus arrhythmia/incomplete RBBB.     Presents today feeling well overall from a cardiac standpoint. Reports prior to recent ED he had worked, played soccer, and drinking resulted in alcohol intoxication. Reports essentially back to normal for the past few days. He had passing out during this alcohol intoxication and was brought to the emergency department. Subsequent URI also. Overall he is without cardiac symptoms besides some  chest tightness during his URI. He has been active exercising lately with no cardiac symptoms. Isolated elevated heart rates occasionally but no sustained tachypalpitation. Stays hydrated with water and electrolyte supplements.     Objective     Vital Signs:   Vitals:     There is no height or weight on file to calculate BMI.  Physical Exam  Constitutional:       Appearance: Normal appearance.   HENT:      Head: Normocephalic.   Pulmonary:      Effort: Pulmonary effort is normal. No respiratory distress.   Neurological:      Mental Status: He is alert.   Psychiatric:         Mood and Affect: Mood normal.         Behavior: Behavior normal.         Thought Content: Thought content normal.     Data Reviewed:{ Labs  Result Review  Imaging  Med Tab  Media :23}     Holter Monitor - 72 Hour Up To 15 Days (01/07/2025 12:41)   Basic Metabolic Panel (12/13/2024 12:06)   Single High Sensitivity Troponin T (12/07/2024 08:29)  CBC & Differential (12/07/2024 08:29)  ECG 12 Lead Rhythm Change (12/07/2024 09:34)  ECG 12 Lead Chest Pain (12/07/2024 08:27)      Assessment and Plan {CC Problem List  Visit Diagnosis  ROS  Review (Popup)  Health Maintenance  Quality  BestPractice  Medications  SmartSets  SnapShot Encounters  Media :23}     This visit has been scheduled as a video visit.     1. Palpitations  -Recent Holter monitor with no evidence of arrhythmia.  Average heart rate 65, low burden PAC/PVC less than 1%.  -Feeling overall well from a cardiovascular standpoint. No significant palpitation symptoms. No sustained tachypalpitation, near syncope/syncope.     2. Non-traumatic rhabdomyolysis  -Recent ED evaluation for rhabdomyolysis in setting of strenuous exercise and alcohol use.  -CK improved at time of PCP follow-up  -Previously referred to [facility] where he previously followed  -Discussed contacting [facility] for appointment, provided telephone number on [facility] website. Instructed to contact my  office if still with scheduling difficulty and may refer to [facility].   -Discussed routine follow-up with PCP      Follow Up {Instructions Charge Capture  Follow-up Communications :23}   Return if symptoms worsen or fail to improve.      Patient was given instructions and counseling regarding his condition or for health maintenance advice. Please see specific information pulled into the AVS if appropriate.  Patient was instructed to call the [facility] with any questions, concerns, or worsening symptoms.    *Please note that portions of this note were completed with a voice recognition program. Efforts were made to edit the dictations, but occasionally words are mistranscribed.

## 2025-02-11 ENCOUNTER — OFFICE VISIT (OUTPATIENT)
Dept: FAMILY MEDICINE CLINIC | Facility: CLINIC | Age: 30
End: 2025-02-11
Payer: COMMERCIAL

## 2025-02-11 VITALS
OXYGEN SATURATION: 98 % | HEIGHT: 71 IN | HEART RATE: 60 BPM | BODY MASS INDEX: 22.65 KG/M2 | SYSTOLIC BLOOD PRESSURE: 118 MMHG | DIASTOLIC BLOOD PRESSURE: 64 MMHG | WEIGHT: 161.8 LBS

## 2025-02-11 DIAGNOSIS — M25.561 ACUTE PAIN OF RIGHT KNEE: ICD-10-CM

## 2025-02-11 DIAGNOSIS — M25.522 LEFT ELBOW PAIN: Primary | ICD-10-CM

## 2025-02-11 PROCEDURE — 99213 OFFICE O/P EST LOW 20 MIN: CPT | Performed by: PHYSICIAN ASSISTANT

## 2025-02-11 PROCEDURE — 1126F AMNT PAIN NOTED NONE PRSNT: CPT | Performed by: PHYSICIAN ASSISTANT

## 2025-02-11 RX ORDER — IBUPROFEN 600 MG/1
600 TABLET, FILM COATED ORAL 2 TIMES DAILY
Qty: 14 TABLET | Refills: 0 | Status: SHIPPED | OUTPATIENT
Start: 2025-02-11 | End: 2025-02-18

## 2025-02-11 NOTE — PROGRESS NOTES
"    Chief Complaint   Patient presents with    Annual Exam     Physical with focus on my right knee (movement and stability) and left elbow pain (since teenager off and on).   Also brief follow up from cardiology and scheduling neurology. I still haven't heard from neurology even though they sent a referral.       HPI     Cj Lerma III is a pleasant 29 y.o. male who presents for evaluation of \"chief complaint.\"     Initially scheduled for physical but prefers to defer a physical and discuss concerns today.   He is scheduled to see neurology in May for follow-up of recurrent nontraumatic rhabdomyolysis.   He reports left elbow pain intermittently since high school. Flared up 2 weeks ago. May have bumped it but denies any significant injury. No pain at rest. Worse with resting or bumping it on something. Denies tingling, numbness.    Right knee feels off and not as stable for past 1 month with walking. He plays soccer but denies known injury. Feels like he cannot jump with as much force of this leg when he plays basketball. Feels like he jammed his knee. Denies pain, swelling, or reduced range of motion.      Past Medical History:   Diagnosis Date    Allergic Seasonal my whole life    Exercise-induced asthma     Headache 3/15/23    Immediate migraine 5 minutes into exercise       Past Surgical History:   Procedure Laterality Date    KNEE ACL RECONSTRUCTION Left     TONSILLECTOMY      WISDOM TOOTH EXTRACTION         Family History   Problem Relation Age of Onset    Thyroid disease Mother     Hypertension Father     Lung cancer Maternal Grandfather     Lung cancer Paternal Grandfather     Hypertension Paternal Grandfather     Breast cancer Paternal Aunt        Social History     Socioeconomic History    Marital status: Single   Tobacco Use    Smoking status: Some Days     Types: Electronic Cigarette    Smokeless tobacco: Never    Tobacco comments:     socially   Vaping Use    Vaping status: Every Day    " Substances: Nicotine, THC, CBD, Flavoring    Devices: Disposable   Substance and Sexual Activity    Alcohol use: Yes     Comment: Very seldom/low tolerance    Drug use: Yes     Types: Marijuana     Comment: I smoke weed 1-4 times a week.    Sexual activity: Yes     Partners: Female     Birth control/protection: I.U.D.       No Known Allergies    ROS    Review of Systems   Musculoskeletal:  Positive for arthralgias. Negative for joint swelling.   Neurological:  Positive for weakness. Negative for numbness.       Vitals:    02/11/25 1350   BP: 118/64   Pulse: 60   SpO2: 98%     Body mass index is 22.58 kg/m².      Current Outpatient Medications:     albuterol sulfate  (90 Base) MCG/ACT inhaler, Inhale 2 puffs Every 4 (Four) Hours As Needed for Wheezing or Shortness of Air., Disp: 8 g, Rfl: 0    fexofenadine (ALLEGRA) 60 MG tablet, Take 1 tablet by mouth Daily. OTC, Disp: , Rfl:     ibuprofen (ADVIL,MOTRIN) 600 MG tablet, Take 1 tablet by mouth 2 (Two) Times a Day for 7 days., Disp: 14 tablet, Rfl: 0    PE    Physical Exam  Vitals reviewed.   Constitutional:       General: He is not in acute distress.  Pulmonary:      Effort: Pulmonary effort is normal. No respiratory distress.   Musculoskeletal:      Left elbow: No swelling or deformity. Normal range of motion. Tenderness present in olecranon process. No medial epicondyle or lateral epicondyle tenderness.      Left knee: No swelling or erythema. Normal range of motion. Tenderness present. No medial joint line, lateral joint line or patellar tendon tenderness. No LCL laxity or MCL laxity.Normal meniscus.      Instability Tests: Anterior drawer test negative. Posterior drawer test negative.      Comments: Mild TTP overlying edge of lateral left patella.    Neurological:      Mental Status: He is alert.   Psychiatric:         Mood and Affect: Mood normal.          A/P    Problem List Items Addressed This Visit    None  Visit Diagnoses       Acute on chronic left  elbow pain    -  Primary    Relevant Orders    Ambulatory Referral to Orthopedic Surgery    Acute pain of right knee        Relevant Orders    XR Knee 3 View Right    Ambulatory Referral to Physical Therapy for Evaluation & Treatment          -Unclear etiology of chronic left elbow pain. ?olecranon bursitis. Discussed icing, rest, ibuprofen, follow-up with orthopedic surgery given chronicity. He reports past x-rays were unremarkable.   -Likely overuse syndrome/tendinitis right knee. Refer to PT.   -RTC for physical, sooner prn.     Plan of care was reviewed with patient at the conclusion of today's visit. Education was provided regarding diagnoses, management, prescribed or recommended OTC products, and the importance of compliance with follow-up appointments. The patient was counseled regarding the risks, benefits, and possible side-effects of treatment. I advised the patient to keep me informed of any acute changes in their status including new, worsening, or persistent symptoms. Patient expresses understanding and agreement with the management plan.        Rodolfo Su PA-C

## 2025-02-20 ENCOUNTER — HOSPITAL ENCOUNTER (OUTPATIENT)
Facility: HOSPITAL | Age: 30
Discharge: HOME | End: 2025-02-20
Admitting: PHYSICIAN ASSISTANT
Payer: COMMERCIAL

## 2025-02-20 ENCOUNTER — OFFICE VISIT (OUTPATIENT)
Age: 30
End: 2025-02-20
Payer: COMMERCIAL

## 2025-02-20 VITALS
BODY MASS INDEX: 22.19 KG/M2 | DIASTOLIC BLOOD PRESSURE: 62 MMHG | SYSTOLIC BLOOD PRESSURE: 100 MMHG | WEIGHT: 155 LBS | HEIGHT: 70 IN

## 2025-02-20 DIAGNOSIS — G56.22 CUBITAL TUNNEL SYNDROME ON LEFT: ICD-10-CM

## 2025-02-20 DIAGNOSIS — M25.522 LEFT ELBOW PAIN: Primary | ICD-10-CM

## 2025-02-20 DIAGNOSIS — M25.561 ACUTE PAIN OF RIGHT KNEE: ICD-10-CM

## 2025-02-20 PROCEDURE — 73562 X-RAY EXAM OF KNEE 3: CPT

## 2025-02-20 NOTE — PROGRESS NOTES
[facility] Office Visit     Office Visit       Date: 02/20/2025   Patient Name: Cj Lerma III  MRN: [id number]  YOB: 1995    Referring Physician: Rodolfo Su PA-C     Chief Complaint:   Chief Complaint   Patient presents with   • Left Elbow - Pain     History of Present Illness:   Cj Lerma III is a 29 y.o. male who presents with new problem of: left elbow pain.  Onset: atraumatic and gradual in nature. The issue has been ongoing for 12 year(s). Pain is a 7/10 on the pain scale. Pain is described as burning and throbbing. Associated symptoms include pain and giving way/buckling. The pain is worse with sleeping, lying on affected side, and putting weight on it ; ice and pain medication and/or NSAID improve the pain. Previous treatments have included: NSAIDS.    Subjective   Review of Systems: Review of Systems   Musculoskeletal:  Positive for arthralgias.        I have reviewed the following portions of the patient's history:History of Present Illness and review of systems.    Past Medical History:   Past Medical History:   Diagnosis Date   • Allergic Seasonal my whole life   • Exercise-induced asthma    • Headache 3/15/23    Immediate migraine 5 minutes into exercise       Past Surgical History:   Past Surgical History:   Procedure Laterality Date   • KNEE ACL RECONSTRUCTION Left    • TONSILLECTOMY     • WISDOM TOOTH EXTRACTION         Family History:   Family History   Problem Relation Age of Onset   • Thyroid disease Mother    • Hypertension Father    • Lung cancer Maternal Grandfather    • Lung cancer Paternal Grandfather    • Hypertension Paternal Grandfather    • Breast cancer Paternal Aunt        Social History:   Social History     Socioeconomic History   • Marital status: Single   Tobacco Use   • Smoking status: Some Days     Types: Electronic Cigarette   • Smokeless tobacco: Never   • Tobacco comments:     socially  "  Vaping Use   • Vaping status: Every Day   • Substances: Nicotine, THC, CBD, Flavoring   • Devices: Disposable   Substance and Sexual Activity   • Alcohol use: Yes     Comment: Very seldom/low tolerance   • Drug use: Yes     Types: Marijuana     Comment: I smoke weed 1-4 times a week.   • Sexual activity: Yes     Partners: Female     Birth control/protection: I.U.D.       Medications:   Current Outpatient Medications:   •  albuterol sulfate  (90 Base) MCG/ACT inhaler, Inhale 2 puffs Every 4 (Four) Hours As Needed for Wheezing or Shortness of Air., Disp: 8 g, Rfl: 0  •  fexofenadine (ALLEGRA) 60 MG tablet, Take 1 tablet by mouth Daily. OTC, Disp: , Rfl:     Allergies: No Known Allergies    I reviewed the patient's chief complaint, history of present illness, review of systems, past medical history, surgical history, family history, social history, medications and allergy list.     Objective    Vital Signs:   Vitals:    02/20/25 1316   BP: 100/62   Weight: 70.3 kg (155 lb)   Height: 178.4 cm (70.25\")     Body mass index is 22.08 kg/m².   BMI is within normal parameters. No other follow-up for BMI required.     In this visit the patient was advised to stop smoking and was offered tobacco cessation measures and resources, including NRT and/or medication intervention. At least 5 minutes was spent on face-to-face counseling regarding smoking cessation.     Ortho Exam:  Cardiovascular System: Arterial Pulses Left: radial normal and ulnar normal. Edema Left: none. Varicosities Left: no varicosities and capillary refill test normal.   Skin: Left Upper Extremity: normal.   Neurologic: Sensation on the Left: normal radial nerve distribution, median nerve distribution, and at the dorsal 1st web space and decreased ulnar nerve distributionand tactile decrease distal extremities. Special Tests on the Left: carpal compression test negative, Phalen's test negative, and Tinel's sign at the ulnar nerve positive.   Left wrist " exam:   Normal wrist contour without evidence of swelling or ecchymosis.   Negative tenderness to palpation at the wrist or carpal bones.   Full motion of all digits.   Negative thenar atrophy   weakness of the intrinsics.    Results Review:   Imaging Results (Last 24 Hours)       Procedure Component Value Units Date/Time    XR Elbow 3+ View Left [[id number]] Resulted: 02/20/25 1352     Updated: 02/20/25 1352    Narrative:      Indication: Left elbow pain.     Views:  AP and lateral views of the elbow are submitted.    Impression: Alignment is normal. There are no acute findings. There is no   fracture subluxation or dislocation.    Comparison: None.              Procedures    Assessment / Plan    Assessment/Plan:   Diagnoses and all orders for this visit:    1. Left elbow pain (Primary)  -     XR Elbow 3+ View Left    2. Cubital tunnel syndrome on left  -     EMG & Nerve Conduction Test    Patient presents today for evaluation of left elbow pain.  Tender over the olecranon as well as in the cubital tunnel.  Good some irritation with tapping of the ulnar nerve.  Occasional pain down the arm but mostly confined to the level of the elbow.  Symptoms ongoing for a number of years.  Radiographs of left elbow not show any acute or degenerative osseous abnormality.  He is a good range of motion and strength though this does affect him with elbow extension.  No problems with the triceps.  Recommend EMG to evaluate the ulnar nerve at the elbow.  Do not detect osseous or muscle injury at this time.  Follow-up after EMG to discuss results and next dose.  No restrictions at this time.    Previous documentation reviewed: 2/11/2025-office visit-abdirahman CONTRERAS.    Previous laboratory results reviewed: 12/13/2024-eGFR 114.8. 4/7/2023-TSH 1.240.    Follow Up:   No follow-ups on file.      August Causey MD  [facility] [place] [facility]

## 2025-03-12 ENCOUNTER — RESULTS FOLLOW-UP (OUTPATIENT)
Dept: ORTHOPEDIC SURGERY | Facility: CLINIC | Age: 30
End: 2025-03-12
Payer: COMMERCIAL

## 2025-07-25 ENCOUNTER — OFFICE VISIT (OUTPATIENT)
Dept: FAMILY MEDICINE CLINIC | Facility: CLINIC | Age: 30
End: 2025-07-25
Payer: COMMERCIAL

## 2025-07-25 VITALS
HEIGHT: 70 IN | SYSTOLIC BLOOD PRESSURE: 110 MMHG | OXYGEN SATURATION: 99 % | BODY MASS INDEX: 20.5 KG/M2 | HEART RATE: 52 BPM | WEIGHT: 143.2 LBS | DIASTOLIC BLOOD PRESSURE: 68 MMHG

## 2025-07-25 DIAGNOSIS — R74.8 ELEVATED CK: Primary | ICD-10-CM

## 2025-07-25 DIAGNOSIS — Z87.898 HISTORY OF ACUTE ALCOHOL INTOXICATION: ICD-10-CM

## 2025-07-25 DIAGNOSIS — M62.82 NON-TRAUMATIC RHABDOMYOLYSIS: ICD-10-CM

## 2025-07-25 PROCEDURE — 99213 OFFICE O/P EST LOW 20 MIN: CPT | Performed by: PHYSICIAN ASSISTANT

## 2025-07-25 PROCEDURE — 1126F AMNT PAIN NOTED NONE PRSNT: CPT | Performed by: PHYSICIAN ASSISTANT

## 2025-07-25 NOTE — PROGRESS NOTES
Chief Complaint   Patient presents with    Hospital Follow Up Visit     Alcohol poisoning        HPI     Cj Lerma III is a 29 y.o. male with a history of exercise induced asthma and episodic nontraumatic rhabdomyolysis who is here for ER follow-up.     He reports he gets tired and weak easily since his ER evaluation on 7/18 though he has improved. He was seen at [facility] for alcohol intoxication, nausea, and vomiting. He is prone to episodes of rhabdomyolysis associated with exertion and dehydration. His CK at the ER was mildly elevated - 321. He was able to play disc golf for 90 minutes 2 days ago but was fatigued afterwards. He has usual soreness but no significant muscle pain. Urine is yellow to clear. He states he missed his appointment in May with  neurology and has not rescheduled this yet. He was also admitted to  ER in December for lactic acidosis, dehydration, cannabis hyperemesis syndrome, and alcohol intoxication.     He states he drinks up to 2-4 beers 1-2 times per week. He still uses THC once daily. He states mixing liquor with beer this time was a misjudgement. He has FMLA forms he is requesting to be completed for his absences and episodic leave for potential flares of rhabdomyolysis in the future. Just missed one day of work while at the ER this time and 2 days for the episode in December. He is usually recovered after 2 days.       Past Medical History:   Diagnosis Date    Allergic Seasonal my whole life    Exercise-induced asthma     Headache 3/15/23    Immediate migraine 5 minutes into exercise       Past Surgical History:   Procedure Laterality Date    KNEE ACL RECONSTRUCTION Left     TONSILLECTOMY      WISDOM TOOTH EXTRACTION         Family History   Problem Relation Age of Onset    Thyroid disease Mother     Hypertension Father     Lung cancer Maternal Grandfather     Lung cancer Paternal Grandfather     Hypertension Paternal Grandfather     Breast cancer Paternal Aunt        Social  History     Socioeconomic History    Marital status: Single   Tobacco Use    Smoking status: Some Days     Types: Electronic Cigarette    Smokeless tobacco: Never    Tobacco comments:     socially   Vaping Use    Vaping status: Every Day    Substances: Nicotine, THC, CBD, Flavoring    Devices: Disposable   Substance and Sexual Activity    Alcohol use: Yes     Comment: Very seldom/low tolerance    Drug use: Yes     Types: Marijuana     Comment: I smoke weed 1-4 times a week.    Sexual activity: Yes     Partners: Female     Birth control/protection: Condom, I.U.D., None, Birth control pill       No Known Allergies    ROS    Review of Systems   Respiratory:  Negative for shortness of breath.    Cardiovascular:  Negative for chest pain.   Musculoskeletal:  Negative for myalgias.   Neurological:  Positive for weakness.       Vitals:    07/25/25 0845   BP: 110/68   Pulse: 52   SpO2: 99%     Body mass index is 20.41 kg/m².      Current Outpatient Medications:     albuterol sulfate  (90 Base) MCG/ACT inhaler, Inhale 2 puffs Every 4 (Four) Hours As Needed for Wheezing or Shortness of Air., Disp: 8 g, Rfl: 0    fexofenadine (ALLEGRA) 60 MG tablet, Take 1 tablet by mouth Daily. OTC, Disp: , Rfl:     PE    Physical Exam  Vitals reviewed.   Constitutional:       General: He is not in acute distress.     Appearance: He is well-developed.   HENT:      Head: Normocephalic and atraumatic.   Eyes:      Conjunctiva/sclera: Conjunctivae normal.   Cardiovascular:      Rate and Rhythm: Normal rate and regular rhythm.      Heart sounds: Normal heart sounds. No murmur heard.  Pulmonary:      Effort: Pulmonary effort is normal.      Breath sounds: Normal breath sounds.   Musculoskeletal:      Cervical back: Normal range of motion.   Skin:     General: Skin is warm and dry.   Neurological:      Mental Status: He is alert.      Gait: Gait normal.   Psychiatric:         Speech: Speech normal.         Behavior: Behavior normal.          Results    Results for orders placed or performed in visit on 12/13/24   Basic Metabolic Panel    Collection Time: 12/13/24 12:06 PM    Specimen: Blood   Result Value Ref Range    Glucose 105 (H) 65 - 99 mg/dL    BUN 18 6 - 20 mg/dL    Creatinine 0.92 0.76 - 1.27 mg/dL    Sodium 138 136 - 145 mmol/L    Potassium 5.0 3.5 - 5.2 mmol/L    Chloride 103 98 - 107 mmol/L    CO2 25.0 22.0 - 29.0 mmol/L    Calcium 9.8 8.6 - 10.5 mg/dL    BUN/Creatinine Ratio 19.6 7.0 - 25.0    Anion Gap 10.0 5.0 - 15.0 mmol/L    eGFR 114.8 >60.0 mL/min/1.73   CK    Collection Time: 12/13/24 12:06 PM    Specimen: Blood   Result Value Ref Range    Creatine Kinase 720 (H) 20 - 200 U/L   Urinalysis With Culture If Indicated - Urine, Clean Catch    Collection Time: 12/13/24 12:06 PM    Specimen: Urine, Clean Catch   Result Value Ref Range    Color, UA Yellow Yellow, Straw    Appearance, UA Clear Clear    pH, UA 6.5 5.0 - 8.0    Specific Gravity, UA 1.026 1.005 - 1.030    Glucose, UA Negative Negative    Ketones, UA Negative Negative    Bilirubin, UA Negative Negative    Blood, UA Negative Negative    Protein, UA Trace (A) Negative    Leuk Esterase, UA Negative Negative    Nitrite, UA Negative Negative    Urobilinogen, UA 0.2 E.U./dL 0.2 - 1.0 E.U./dL   [place] Urine Culture Tube - Urine, Clean Catch    Collection Time: 12/13/24 12:06 PM    Specimen: Urine, Clean Catch   Result Value Ref Range    Extra Tube Hold for add-ons.        A/P    Problem List Items Addressed This Visit          Musculoskeletal and Injuries    Non-traumatic rhabdomyolysis     Other Visit Diagnoses         Elevated CK    -  Primary      History of acute alcohol intoxication              -The patient has been improving since his ER evaluation. I encouraged him to reschedule follow-up with his neurologist at the [facility]. He as counseled to avoid situations in which he may become dehydrated and to drink plenty of fluids in order to maintain a light to  clear colored urine. I encouraged he minimize or avoid alcohol intake. He declines referral to a talk therapist today for his alcohol consumption.   -RTC in 3 months for routine physical, sooner prn.     Addendum: FMLA forms completed 7/29/25.     Plan of care was reviewed with patient at the conclusion of today's visit. Education was provided regarding diagnoses, management, and the importance of keeping follow-up appointments. The patient was counseled regarding the risks, benefits, and possible side-effects of treatment. Patient and/or family express understanding and agreement with the management plan.        Rodolfo Su PA-C